# Patient Record
Sex: FEMALE | Race: BLACK OR AFRICAN AMERICAN | Employment: FULL TIME | URBAN - METROPOLITAN AREA
[De-identification: names, ages, dates, MRNs, and addresses within clinical notes are randomized per-mention and may not be internally consistent; named-entity substitution may affect disease eponyms.]

---

## 2018-11-22 PROBLEM — I16.1 HYPERTENSIVE EMERGENCY WITHOUT CONGESTIVE HEART FAILURE: Status: ACTIVE | Noted: 2018-11-22

## 2018-11-22 PROBLEM — R73.9 HYPERGLYCEMIA: Status: ACTIVE | Noted: 2018-11-22

## 2018-11-22 PROBLEM — R79.89 ELEVATED TROPONIN: Status: ACTIVE | Noted: 2018-11-22

## 2018-11-22 NOTE — ED NOTES
Pt states she did not know she has high blood pressure. Does not have a doctor. Pt given hand out for EMG doctors. Encouraged pt to get established with a PCP immediately to evaluate her blood pressure.   Pt denies any headache, blurred vision, dizziness

## 2018-11-22 NOTE — ED PROVIDER NOTES
Patient Seen in: [facility]    History   Patient presents with:  Laceration Abrasion (integumentary)    Stated Complaint: LACERATION RIGHT INDEX FINGER    HPI    CHIEF COMPLAINT: Right index finger laceration    HISTORY OF PRESE Current:BP (!) 221/97   Pulse 72   Temp 97.9 °F (36.6 °C) (Oral)   Resp 16   Ht 181.6 cm (5' 11.5\")   Wt 79.4 kg   SpO2 98%   BMI 24.07 kg/m²         Physical Exam   Constitutional: She is oriented to person, place, and time.  She appears well-developed an Rate, intervals and axes as noted on EKG Report. Rate: 80  Rhythm: Sinus Rhythm  Reading: Normal sinus rhythm with inverted T waves noted in the lateral leads without ST elevation, ectopy. No  EKG for comparison. 1420:  After lengthy discussion with otis

## 2018-11-22 NOTE — ED INITIAL ASSESSMENT (HPI)
Pt states sustained laceration to pad of right index finger 90 minutes ago. Arrived with a hair band wrapped around her fingertip.   Finger mobile  Pt hypertensive on arrival.

## 2018-11-23 PROBLEM — R79.89 TROPONIN I ABOVE REFERENCE RANGE: Status: ACTIVE | Noted: 2018-11-22

## 2018-11-23 NOTE — DIETARY NOTE
Dietitian Short Note - Education    Received consult for new dx DM. Per pt MD did not give her an official dx yet but HgbA1C was elevated at 7.7. Pt with poor eating habits which include skipping meals, snacking on junk food, and drinking sugary beverages.

## 2018-11-23 NOTE — PLAN OF CARE
Reviewed stress echo and echo findings with Dr. Eduardo Magallanes. Patient complained of some L sided chest pain this afternoon that resolved with belching. ECG reviewed and without significant change yet abnormal, repeat troponin sent.      Plan to stay in hospital

## 2018-11-23 NOTE — PLAN OF CARE
Pt. Is alert and oriented times four. Lungs clear on auscultation. Blood pressure now running in the 865'I systolic. Pt. Has no c/o pain. Pt. Is sinus rhythm on monitor. Plan for stress echo today. M. D. Aware of elevated troponins and EKG changes.

## 2018-11-23 NOTE — ED PROVIDER NOTES
Patient Seen in: [facility] Emergency Department    History   Patient presents with:  Hypertension (cardiovascular)    Stated Complaint: hypertension, abnormal EKG    HPI    24-year-old female presents the emergency department [facility]. No accessory muscle use noted for breathing. Cardiac: Regular rate and rhythm. Normal S1 and 2 without murmurs or ectopy appreciated  Abdomen: Soft on examination without tenderness to deep palpation or to percussion.   No masses appreciated Avril Humphrey MD on 11/22/2018 at 16:34     Approved by: Avril Humphrey MD                [facility]   Patient was initially given oral clonidine for blood pressure. Laboratories are sent glucose 129 creatinine 1.06. CBC was normal.  Troponin was 0.167.

## 2018-11-23 NOTE — H&P
[facility]                                                               History & Physical         [facility] Patient Status:  Inpatient    1960 MRN [id number]   [facility] 8NE-A Attending Jacy Weir MD   [facility] Day # drugs. Allergies:  No Known Allergies    Home Medications:    No medications prior to admission. Review of Systems:  A comprehensive 14 point review of systems was completed. Pertinent positives and negatives noted in the the HPI.     Physical Exam: to [place] urgent care. Patient states BP was high and EKG was abnormal.  Patient denies pain. FINDINGS:  Mild left basilar atelectasis. Cardiac silhouette is mildly prominent size. Costophrenic angles are clear.   No measurable pneumothorax

## 2018-11-23 NOTE — PROGRESS NOTES
[facility]  Progress Note    Darryl Lyn Patient Status:  Inpatient    1960 MRN [id number]   [facility] 8NE-A Attending Eyl Barrera, [address] [facility] Day # 1 PCP PHYSICIAN NONSTAFF     A/P:  HTN Emergency with elevated troponin 85.8 11/23/2018    MCH 28.3 11/23/2018    MCHC 33.0 11/23/2018    RDW 13.1 11/23/2018    .0 11/23/2018     No results found for: PT, INR    Medications:    Current Facility-Administered Medications:   AmLODIPine Besylate (NORVASC) tab 5 mg 5 mg Oral

## 2018-11-23 NOTE — PROGRESS NOTES
[facility]  Progress Note     Justen Lopez Patient Status:  Inpatient    1960 MRN [id number]   [facility] 8NE-A Attending Shirlene Villasenor, [address] [facility] Day # 1 PCP PHYSICIAN NONSTAFF     Chief Complaint: finger laceration, unco • Metoprolol Succinate ER  25 mg Oral 2x Daily(Beta Blocker)       ASSESSMENT / PLAN:       1. Malignant HTN with hypertensive emergency with elevated troponin, new onset hypertension  1. metoprolol and Norvasc. asa   2. Follow troponin trend.   osorio lower

## 2018-11-23 NOTE — CONSULTS
CARDIOLOGY CONSULT - [facility]      NAME: Ivon [facility] - ROOM: 8418/[id number] - MRN: [id number] - Age: 59year old - :   1960    Date of Admission: 2018  3:33 PM  Admission Diagnosis: Elevated troponin [R74.8] Oral   SpO2: 99% 97% 99% 98%   Weight:       Height:           Oxygen Therapy  SpO2: 98 %  O2 Device: None (Room air)  Pulse Oximetry Type: Continuous                       Wt Readings from Last 3 Encounters:  11/22/18 : 175 lb (79.4 kg)  11/22/18 : 175 l

## 2018-11-23 NOTE — PLAN OF CARE
Pt. States that she has a dull pressure under her left breast. 12 lead EKG done. Some changes noted in 2, 3 and AVF. She states the pain is gone now after belching. SHANTAL Peterson notified.

## 2018-11-23 NOTE — IMAGING NOTE
Preliminary stress echo  Resting EKG NSR, PRWP, LAD, NSSTTW  Patient completed 7 minutes on a chandler protocol achieving 91% APMHR and a peak /100. Patient denied cardiac symptoms. Stopped due to fatigue. ST changes noted with exercise.   Echo to

## 2018-11-24 NOTE — PROGRESS NOTES
[facility] Cardiology Progress Note    Subjective:  Feels good. Asking about some over the counter medications she takes. Relates long standing hx of GERD. Did not have cp on treadmill yesterday.      Objective:  BP (!) 183/95 (BP Location: Right arm)   Pulse

## 2018-11-24 NOTE — PAYOR COMM NOTE
--------------  CONTINUED STAY REVIEW    Payor: [facility] PP  Subscriber #:  [id number]  Authorization Number: [id number]    Admit date: 11/22/18  Admit time: 1957    Admitting Physician: Nicole Rodriguez MD  Attending Physician:  Cielo Vernon MD  Primary C of 1.07 mg/dL (H)). Lab  11/22/18   1549  11/22/18   2207  11/23/18   0526   TROP  0.167*  0.163*  0.166*  0.149*       ASSESSMENT / PLAN:         1.  Malignant HTN with hypertensive emergency with elevated troponin, new onset hypertension  1. metoprolol monday            MEDICATIONS ADMINISTERED IN LAST 1 DAY:  AmLODIPine Besylate (NORVASC) tab 5 mg     Date Action Dose Route User    11/24/2018 0828 Given 5 mg Oral Theo Lewis, RN      hydrALAzine HCl (APRESOLINE) injection 10 mg     Date Action Dose Ro

## 2018-11-24 NOTE — PROGRESS NOTES
[facility]  Progress Note     Larry Loyola Patient Status:  Inpatient    1960 MRN [id number]   [facility] 8NE-A Attending Teagan Ching, [address] [facility] Day # 2 PCP PHYSICIAN NONSTAFF     Chief Complaint: finger laceration, unco AmLODIPine Besylate  5 mg Oral Daily   • Metoprolol Succinate ER  50 mg Oral 2x Daily(Beta Blocker)   • aspirin  325 mg Oral Daily       ASSESSMENT / PLAN:       1.  Malignant HTN with hypertensive emergency with elevated troponin, new onset hypertension  1

## 2018-11-24 NOTE — PROGRESS NOTES
11/24/18 2914   Clinical Encounter Type   Visited With Patient  (Visiting  at bedside)   Routine Visit Introduction  (Visiting  requested Clarice ybarra and wafer to offer the pt.)   Continue Visiting No   Patient's [employer]

## 2018-11-25 NOTE — PROGRESS NOTES
CARDIOLOGY PROGRESS NOTE - [facility]      NAME: Onofre Lowery - ROOM: [id number] - MRN: [id number] - Age: 59year old - :  1960    Date of Admission: 2018  3:33 PM  Admission Diagnosis: Elevated troponin [R74. --    CA  8.7  8.6   --    --    MG   --   2.3   --    --        Recent Labs      11/22/18   1549   ALT  33   AST  25   ALB  3.8       ASSESSMENT:   60 yo F    Hypertensive urgency, BP now better  Trop 0.16  Abnormal stress echo, hyperdynamic response to

## 2018-11-25 NOTE — PROGRESS NOTES
[facility]  Progress Note     [facility] Patient Status:  Inpatient    1960 MRN [id number]   [facility] 8NE-A Attending Isaac Mc, [address] [facility] Day # 3 PCP PHYSICIAN NONSTAFF     Chief Complaint: finger laceration, unco 0.163*  0.166*  0.149*  0.163*            Imaging: Imaging data reviewed in Epic.     Medications:   • atorvastatin  20 mg Oral Nightly   • AmLODIPine Besylate  10 mg Oral Daily   • lisinopril  5 mg Oral Daily   • Metoprolol Succinate ER  50 mg Oral 2x Gail

## 2018-11-26 NOTE — PLAN OF CARE
Patient is alert and oriented times four. Lungs clear on auscultation. Pt. Has no c/o pain. IV of 0.9 NS at 20 cc/hr. Pt. Is sinus rhythm on monitor. No c/o pain at present. She is NPO for angiogram. Procedure explained to patient. Consent has been signed.

## 2018-11-26 NOTE — PROCEDURES
[facility]  PCI Procedure Note    Tennie Screen Location: Cath Lab    CSN [id number] MRN [id number]   Admission Date 11/22/2018 Procedure Date 11/26/2018   Attending Physician Alfredo Tomas MD Procedure Physician Sky Martinez MD     Pre-Procedu rate was monitored throughout the case from 1230 until 13 10 PM      Impression:  1. Prior to PTCA and stent placement there is MIKE-3 flow in the LAD with a 75% stenosis in the mid LAD and a 90% stenosis in the proximal LAD.   Following PTCA and stent vivian

## 2018-11-26 NOTE — DIETARY NOTE
Nutrition Short Note    Dietitian consult received per cardiac rehab/CHF protocol. Pt to be educated by cardiac rehab staff and encouraged to attend outpatient classes taught by RAMSES. RD available PRN.     Clover Agudelo RD, LDN  Pager #[phone]

## 2018-11-26 NOTE — PAYOR COMM NOTE
--------------  CONTINUED STAY REVIEW    Payor: [facility] PP  Subscriber #:  [id number]  Authorization Number: [id number]    Admit date: 11/22/18  Admit time: 1957    Admitting Physician: Andrea Camilo MD  Attending Physician:  Veronica Mckeon MD  Primary C status: full  · Humphrey: no             MEDICATIONS ADMINISTERED IN LAST 1 DAY:  AmLODIPine Besylate (NORVASC) tab 10 mg     Date Action Dose Route User    11/25/2018 0921 Given 10 mg Oral Demi Sanchez, RN      hydrALAzine HCl (APRESOLINE) injection 10 m

## 2018-11-26 NOTE — PROGRESS NOTES
11/26/18 1236   Clinical Encounter Type   Visited With Patient not available   Spiritism Encounters   Spiritual Requests During Visit / Hospitalization Communion  ( attempted visit twice. Patient not in the room.   to remain available at

## 2018-11-26 NOTE — PROCEDURES
[facility]  Angiogram Procedure Note    Juanita Heller Location: Cath Lab    CSN [id number] MRN [id number]   Admission Date 11/22/2018 Procedure Date 11/26/2018   Attending Physician Neo Salguero MD Procedure Physician Breana Grove MD     Pre-P ejection fraction is 60%    Selective renal angiography shows no evidence of renal artery stenosis    Selective Coronary Angiography Findings: 1. Angiography of the right coronary artery is remarkable for this being a right dominant system.   The right cor

## 2018-11-26 NOTE — PROGRESS NOTES
[facility]  Progress Note     [facility] Patient Status:  Inpatient    1960 MRN [id number]   [facility] 8NE-A Attending Isaac Mc, [address] [facility] Day # 4 PCP PHYSICIAN NONSTAFF     Chief Complaint: finger laceration, unco 0.163*  0.166*  0.149*  0.163*            Imaging: Imaging data reviewed in Epic.     Medications:   • [START ON 11/27/2018] aspirin  325 mg Oral Daily   • atorvastatin  20 mg Oral Nightly   • AmLODIPine Besylate  10 mg Oral Daily   • lisinopril  5 mg Oral

## 2018-11-26 NOTE — CONSULTS
[facility]  Diabetes Clinical Nurse Specialist Consult Note    [facility] Patient Status:  Inpatient    1960 MRN [id number]   [facility] 8NE-A Attending Ocatvia Schulte MD   Hosp Day # 4 PCP PHYSICIAN NONSTAFF     Reason f to the [facility] for Initial Diabetes Self Management Training Classes, new onset diabetes    PRESCRIPTION RECOMMENDATIONS:    · Consider metformin   · [facility] manager  · Covers the Contour Next glucometer and test stri

## 2018-11-26 NOTE — PROGRESS NOTES
11/26/18 1945   Clinical Encounter Type   Visited With Patient   Continue Visiting No  ( remains available at pager #[phone] as needed/requested.)   Patient's Supportive Strategies/Resources  provided emotional and spiritual support to osmin

## 2018-11-27 NOTE — PROGRESS NOTES
[facility]  Cardiology Progress Note    Subjective:  No chest pain or shortness of breath. Right groin cath site c/d/i, no hematoma, no bruit.     Objective:  /65 (BP Location: Left arm)   Pulse 61   Temp 98.4 °F (36.9 °C) (Oral)   Resp 20   Ht Follow-up with us in the office as outlined above.   Vida Carbone

## 2018-11-27 NOTE — PLAN OF CARE
NURSING DISCHARGE NOTE    Discharged to home  via private car. Accompanied by family  Belongings taken with. .    Pt discharged to home. Discharge instructions and script reviewed with pt . All questions answered. IV catheter removed.  Pt tolerated wel

## 2018-11-27 NOTE — PROGRESS NOTES
[facility] [address]  [place], [address]  ?  11/27/18  ? Re: Ez Stanley  ? To Whom It May Concern:    Ez Stanley was admitted to [facility] from 11/22/2018 to 11/27/18.     Please excuse Ezpetr Stanley from atte

## 2018-11-27 NOTE — PLAN OF CARE
Pt alert able to make needs known. Denies any pain. Right groin soft to touch. No bleeding or hematoma noted. Pt scheduled to be discharged today. Plan of care discussed with pt, verbalized understanding.   Call light within reach, will cont to [facility]

## 2018-11-27 NOTE — CONSULTS
[facility]  Diabetes Clinical Nurse Specialist Progress Note    Bon Mcdonald Patient Status:  Inpatient    1960 MRN [id number]   [facility] 8NE-A Attending Radha Melvin MD   Hosp Day # 5 PCP PHYSICIAN NONSTAFF     Diagnos introduction to carbohydrate counting and meal planning  Discharge to [facility] for Initial Diabetes Self Management Training Classes, new onset diabetes  Discharge to new PCP    PROVIDER F/U RECOMMENDATIONS:    · Patient's current PCP  · [place]

## 2018-11-27 NOTE — CARDIAC REHAB
Cardiac rehab education completed with patient. Stent card given to patient. Patient to contact [facility] for her outpatient appointment.

## 2018-11-28 NOTE — TELEPHONE ENCOUNTER
np called to advise she was not supposed to start MetFORMIN until tomorrow but started today. Pt is concerned because she started meds too soon. Please call to advise.

## 2018-11-28 NOTE — DISCHARGE SUMMARY
[facility]  DISCHARGE SUMMARY     Onofre Lowery Patient Status:  Inpatient    1960 MRN [id number]   [facility] 8NE-A Attending No att. providers found   Hosp Day # 5 PCP PHYSICIAN NONSTAFF     Date of Admission: 2018 have uncontrolled dm2. Was started on several antihypertensives, see below, as well as statin, asa, metformin, the latter for dm2. Underwent angiogram and had LAD occlusion so was deployed Deisy x3 to LAD. See accurate med list below.     Lace+ Score: 46 LANCETS 33G Misc      Use one lancet for each blood glucose test. Test 3 x week in the AM before breakfast, and 2 x a week 2 hours after large meal.   Quantity:  100 each  Refills:  3     Prasugrel HCl 10 MG Tabs  Commonly known as:  EFFIENT      Take 1 ta auscultation bilaterally. No wheezes. No rhonchi. Cardiovascular: S1, S2. Regular rate and rhythm. No murmurs, rubs or gallops. Abdomen: Soft, nontender, nondistended. Positive bowel sounds. No rebound or guarding.   Neurologic: No focal neurological de

## 2018-11-28 NOTE — PAYOR COMM NOTE
--------------  DISCHARGE REVIEW    Payor: [facility]  Subscriber #:  [id number]  Authorization Number: [id number]    Admit date: 11/22/18  Admit time:  1957  Discharge Date: 11/27/2018  3:07 PM     Admitting Physician: Andrea Camilo MD  Primary Care Phys

## 2018-12-05 NOTE — PROGRESS NOTES
Jarred Huff  : 1960 attended Step 1 Diabetic Education:    Date: 2018   Start time: 1:30pm End time: 2:30pm    /81   Pulse 62   Ht 60\"   Wt 175 lb   BMI 34.18 kg/m²     HgbA1C (%)   Date Value   2018 7.7 (H)        Depressio

## 2018-12-07 NOTE — PROGRESS NOTES
[address] [facility] Internal Medicine Office Note  Chief Complaint:   Patient presents with:  [facility] F/U: 11/22/18~ Suture removal right index finger dt laceration. Also, recently diagnosed with Type II DM.        HPI:   This is a 59 year o Besylate 10 MG Oral Tab Take 1 tablet (10 mg total) by mouth daily. Disp: 30 tablet Rfl: 2   atorvastatin 20 MG Oral Tab Take 1 tablet (20 mg total) by mouth nightly.  Disp: 30 tablet Rfl: 2   Glucose Blood (CONTOUR NEXT TEST) In Vitro Strip Use one test st Vital signs reviewed. Appears stated age, well groomed. Physical Exam   Vitals reviewed. Constitutional: She appears well-developed. No distress. HENT:   Head: Normocephalic and atraumatic. Right Ear: Tympanic membrane is not erythematous.    Left this encounter       Imaging & Consults:  None    Diabetes Care Dilated Eye Exam due on 12/08/1960  Mammogram due on 12/08/1960  Colonoscopy due on 12/08/1960  Annual Physical due on 12/08/1960  Pneumococcal PPSV23 Medium Risk Adult(1 of 1 - PPSV23) due on

## 2018-12-07 NOTE — TELEPHONE ENCOUNTER
Faxed [facility] paperwork to #[phone]. [facility] form unsigned by patient. Call patient Monday (12/10/18) on how to proceed.

## 2018-12-07 NOTE — TELEPHONE ENCOUNTER
Received FMLA forms from [address]. Pt requesting for them to be faxed to the contact # on the form. Needs to be completed prior to next Friday. Forms are in your inbasket. Thank you!

## 2018-12-10 NOTE — TELEPHONE ENCOUNTER
Patient has appointment 12/12/18 with Brenden Garrido RN here in our office. Will  original FMLA form at that time. Patient states will call Jasmyn Mauro ([employer] DEPT) to see if her signature is needed on the FMLA form.   Will refax [employer] [facility]

## 2018-12-12 NOTE — PROGRESS NOTES
Cristino Bernheim  [id number] attended Step 2 Diabetic Education:    Date: 12/12/2018    Start time: 8am End time: 9:30am      Diabetes Overview, pathophysiology, pre-diabetes, A1C results and treatment options for diabetes self-management, types of diab

## 2018-12-13 NOTE — TELEPHONE ENCOUNTER
3 way call from [facility] for patient to specify what specifications she needs for refill of Contour test strips :  Please send 100 strips for 30 day supply.  Any questions/call patient

## 2018-12-13 NOTE — TELEPHONE ENCOUNTER
Spoke with pt and notified her refill sent in on 12/4/18 by [facility] for 100 test strips with 2 refills. Pt stated that the pharmacy only gave her 25 test strips.      Called pharmacy and they stated pt's insurance would only accept 25 test strips as they had

## 2018-12-21 NOTE — PROGRESS NOTES
[facility] Internal Medicine Office Note  Chief Complaint:   Patient presents with:  CPX: with breast exam.       HPI:   This is a 59year old female coming in for physical  HPI  She notes she bruises easily on extremities.  Not painful     She ea glucose test. Test 3 x a week in the AM, and 2 x week after a meal. Disp: 50 strip Rfl: 3   ONETOUCH DELICA LANCETS 31R Does not apply Misc Use one lancet for each blood glucose test. Test 3 x week in the AM before breakfast, and 2 x a week 2 hours after l normal. Right breast exhibits no mass and no nipple discharge. Left breast exhibits no mass and no nipple discharge. Breasts are symmetrical.   Abdominal: Soft. There is no tenderness. Lymphadenopathy:     She has no cervical adenopathy.    Neurological: daily.        Orders Placed This Encounter      TSH W Reflex To Free T4 [E]      Meds & Refills for this Visit:  Requested Prescriptions     Signed Prescriptions Disp Refills   • MetFORMIN HCl 500 MG Oral Tab 180 tablet 3     Sig: Take 1 tablet (500 mg tot

## 2018-12-21 NOTE — PATIENT INSTRUCTIONS
Prevnar pneumonia vaccine recommended  shingrix recommended  Flu vaccine strongly recommended     Blood work - TSH for thyroid - can test when you have your other blood work drawn for cardiology     Feb 24 or later for follow up appointment with me and we

## 2019-01-09 NOTE — PROGRESS NOTES
Jose Bird  [id number] attended Step 3 Diabetic Education:    Date: 1/9/2019  Start time: 8am End time: 9am          Prevention, detection and treatment of chronic complications  Reviewed how to reduce risks of complications including eye, foot, d

## 2019-01-15 NOTE — TELEPHONE ENCOUNTER
Spoke with pt and notified her of results and provider instructions. Pt stated that originally she was prescribed 500 mg BID of Metformin at the end of November.  Pt stated that as of last Wednesday she met with Elaine Garzon and was told to decrease the Metformin

## 2019-01-17 NOTE — TELEPHONE ENCOUNTER
If she is checking glu at different times of day (ok to check once daily) and avg is still around 116, then it is ok to continue metformin once daily

## 2019-01-17 NOTE — TELEPHONE ENCOUNTER
Pt called back. Pt stated that she is checking blood sugar BID and it is averaging around 116. Notified pt of provider response. Pt stated that she was taking 1 BID again and wanted to know if provider wanted her to continue that.  Notified pt again of prov

## 2019-02-28 NOTE — PROGRESS NOTES
[facility] Internal Medicine Office Note  Chief Complaint:   Patient presents with:   Follow - Up      HPI:   This is a 59year old female coming in for DM follow up  HPI    Her fasting glu have been doing well   In 70s  After having a lot of frui blood glucose test. Test 3 x a week in the AM, and 2 x week after a meal. Disp: 50 strip Rfl: 3   ONETOUCH DELICA LANCETS 43F Does not apply Misc Use one lancet for each blood glucose test. Test 3 x week in the AM before breakfast, and 2 x a week 2 hours a visit:    New onset type 2 diabetes mellitus ([facility] [address] - due for a1c after April 10. Cont metformin.  F/u in 3 months  -     HEMOGLOBIN A1C; Future    Essential hypertension -BP well controlled; cont present management       Orders Placed This Encounter      He

## 2019-03-27 NOTE — PROGRESS NOTES
Fredrick Ibrahim  :1960 attended Step 4 Diabetic Education:    Date: 3/27/2019 Referring Provider: Josue Larose  Start time: 10:30 End time: 11:30am      Ht 62\"   Wt 170 lb   BMI 31.09 kg/m²   Weight Change:  Pt has lost 13 lbs since her first visit wi [] Shea Figueroa Iman [phone] www. [employer]      [] [employer] www. [employer]      [] [employer] [phone]    Journals      [] Diabetes Forecast [phone] www. diabetesforecast.com      [] Diabetes Self-Management [phone]

## 2019-04-06 NOTE — TELEPHONE ENCOUNTER
Please call patient that she is due for diabetic eye exam. See [facility] [phone]

## 2019-04-09 NOTE — TELEPHONE ENCOUNTER
BRYANM for patient that she is due for her diabetic eye exam and that she can schedule that appointment at the [facility].

## 2019-05-06 NOTE — TELEPHONE ENCOUNTER
Notes recorded by Silvia Bernal MD on 5/2/2019 at 2:02 PM CDT  a1c is improved to 6.8; repeat in 3 months      Pt notified of results and provider instructions. Pt verbalized understanding. Repeat lab ordered.

## 2019-06-12 NOTE — TELEPHONE ENCOUNTER
Refill requested:   Requested Prescriptions     Pending Prescriptions Disp Refills   • AMLODIPINE BESYLATE 10 MG Oral Tab [Pharmacy Med Name: AMLODIPINE 10MG TAB] 90 tablet 1     Sig: TAKE 1 TABLET BY MOUTH ONCE DAILY     Passed protocol    Last refill: 12 registered dietitian and  diabetes educator. Half of this appointment time is dedicated to the  development of a personalized education plan. This plan is developed  privately in a 1:1 session with the educator.  The other half hour is  devoted to learning

## 2019-06-26 NOTE — PROGRESS NOTES
Justen Lopez  : 1960 was seen for Diabetic Education Follow up:    Date: 2019  Referring Provider: Ileana Hernández  Start time: 8am End time: 9am    Assessment:     Assessment: Ht 62\"   Wt 174 lb   BMI 31.83 kg/m²      HgbA1C (%)   Date Sidra complications reviewed including eye, dental, CV health, renal protection, and foot care discussed. Health Coping  Family involvement/support encouraged  Depression and diabetes reviewed. Recommendations:      1. Follow recommended meal plan.    2. Te

## 2019-06-27 NOTE — PROGRESS NOTES
[facility] Internal Medicine Office Note  Chief Complaint:   Patient presents with:   Follow - Up: DM follow up   Cough: dry cough       HPI:   This is a 59year old female coming in for follow up  HPI  DM -   She has diabetic retinopathy and sees tablet (20 mg total) by mouth nightly.  Disp: 30 tablet Rfl: 2   Glucose Blood (CONTOUR NEXT TEST) In Vitro Strip Use one test strip for each blood glucose test. Test 3 x a week in the AM, and 2 x week after a meal. Disp: 50 strip Rfl: 3   ONETOUCH DELICA L barefoot skin diabetic exam is normal, visualized feet and the appearance is normal.  Bilateral monofilament/sensation of both feet is normal.  Pulsation pedal pulse exam of both lower legs/feet is normal as well.   ASSESSMENT AND PLAN:   Jarred Huff Education: Patient/Caregiver Education: There are no barriers to learning. Medical education done. Outcome: Patient verbalizes understanding. Patient is notified to call with any questions, complications, allergies, or worsening or changing symptoms.   Barbara

## 2019-06-27 NOTE — PATIENT INSTRUCTIONS
Blood work after August 3     Try claritin, zyrtec, or allegra (one at a time)  xyzal if above not helpful  Call me if symptoms continue and we can add on singulair/montelukast        For heartburn:   Can take zantac over the counter as needed (ranitidine

## 2019-10-01 NOTE — PATIENT INSTRUCTIONS
vanicream or vaseline recommended     Flu shot recommended     Stool testing recommended - can go to the lab

## 2019-10-01 NOTE — PROGRESS NOTES
[facility] Internal Medicine Office Note  Chief Complaint:   Patient presents with:   Follow - Up: 3 month follow up       HPI:   This is a 59year old female coming in for DM   HPI  DM - she met with Fela Gomez and discussed that fasting should be bel Disp: 30 tablet Rfl: 2   ONETOUCH DELICA LANCETS 98E Does not apply Misc Use one lancet for each blood glucose test. Test 3 x week in the AM before breakfast, and 2 x a week 2 hours after large meal. Disp: 100 each Rfl: 3   aspirin 81 MG Oral Tab EC Take 1 edema, without long-term current use of insulin ([address]  Uncontrolled type 2 diabetes mellitus with hyperglycemia ([address]      The plan is as follows  Tisha Loza was seen today for follow - up.     Diagnoses and all orders for this visit:    Essential hypertension Patient/Caregiver Education: There are no barriers to learning. Medical education done. Outcome: Patient verbalizes understanding. Patient is notified to call with any questions, complications, allergies, or worsening or changing symptoms.   Patient is to c

## 2020-01-27 NOTE — TELEPHONE ENCOUNTER
Received Eye Exam from Dr Vianey Fonseca. Placed in your inbasket. Exam completed on 1/25/2020 - placed in your inbasket. Moderate NPDR on Left and Right Eye.

## 2020-02-11 NOTE — PROGRESS NOTES
[address] [facility] Internal Medicine Office Note  Chief Complaint:   Patient presents with:   Follow - Up: 4 month follow up/ fasting       HPI:   This is a 62year old female coming in for physical  HPI    DM - glu at home ranging from 120s-150s fasting Take 75 mg by mouth daily. • Metoprolol Succinate ER 25 MG Oral Tablet 24 Hr Take 25 mg by mouth daily. • atorvastatin 20 MG Oral Tab Take 1 tablet (20 mg total) by mouth nightly.  30 tablet 2   • ONETOUCH DELICA LANCETS 15T Does not apply Misc Use Pulmonary/Chest: Effort normal and breath sounds normal.   Abdominal: Soft. There is no tenderness. Lymphadenopathy:     She has no cervical adenopathy. Neurological: She is alert. Skin: Skin is warm and dry.    Psychiatric: She has a normal mood an MICROALB/CREAT RATIO, RANDOM URINE;  Future  -     PODIATRY - INTERNAL    Dyspnea on exertion -may be related to deconditioning as she has not been exercising but she needs to discuss this with her cardiologist in regards to need for further testing     Thi

## 2020-02-11 NOTE — PATIENT INSTRUCTIONS
Flu shot recommended    Pneumonia vaccine (prevnar) recommended    Shingrix shingles vaccine recommended        Blood work Feb 16 or later       Monitor blood pressure at home and if persists under 110, let your cardiologist know because amlodipine dose ma

## 2020-07-17 NOTE — TELEPHONE ENCOUNTER
Patient calling b/c she just realized she missed a June 2020 f/u appt with Dr Caity Thibodeaux. Informed her that reminder calls were disabled and it was just no show without a charge; also told her doctor wanted her to follow with dm f/u appt at that time.  She want

## 2020-08-18 NOTE — PROGRESS NOTES
[facility] Internal Medicine Office Note  Chief Complaint:   Patient presents with:   Follow - Up: DM check       HPI:   This is a 62year old female coming in for DM follow up  HPI  DM - on metformin; last a1c 7.4    HTN - BP on lower side at 110 ONETOUCH DELICA LANCETS 94R Does not apply Misc Use one lancet for each blood glucose test. Test 3 x week in the AM before breakfast, and 2 x a week 2 hours after large meal. 100 each 3   • aspirin 81 MG Oral Tab EC Take 1 tablet (81 mg total) by mouth josé luis today for follow - up.     Diagnoses and all orders for this visit:    Type 2 diabetes mellitus without complication, without long-term current use of insulin (HCC) -cont metformin; a1c at goal  -     HEMOGLOBIN A1C =6.9    Essential hypertension - cont lis

## 2020-08-18 NOTE — TELEPHONE ENCOUNTER
Persons with disabilities certification for parking placard/license plates form filled out at time of appt     Original given to pt  Copy sent to scan and copy placed in FMLA/Dias[employer] accordion

## 2020-08-19 NOTE — TELEPHONE ENCOUNTER
Please advise what labs if any pt should complete prior to upcoming appt. Annual labs completed in 2/2020. A1c completed 8/18/2020.  TY

## 2020-08-19 NOTE — TELEPHONE ENCOUNTER
Patient has an appt scheduled on 09/08/2020 and is requesting her labs to be placed prior to her appt so she can schedule with the lab. Please call pt with order status.

## 2020-09-02 NOTE — TELEPHONE ENCOUNTER
Pt had questions regarding the mildly abnormal microalbumin. Pt stated she is going to discuss this w/ Dr. Arin Avila at upcoming appointment.

## 2020-09-08 PROBLEM — E11.29 MICROALBUMINURIA DUE TO TYPE 2 DIABETES MELLITUS (HCC): Status: ACTIVE | Noted: 2020-09-08

## 2020-09-08 PROBLEM — R80.9 MICROALBUMINURIA DUE TO TYPE 2 DIABETES MELLITUS (HCC): Status: ACTIVE | Noted: 2020-09-08

## 2020-09-08 NOTE — PATIENT INSTRUCTIONS
Call central scheduling to make appointment for mammogram and upcoming blood work [phone]    Blood work due 11/19/2020 or later

## 2020-09-08 NOTE — PROGRESS NOTES
[facility] Internal Medicine Office Note  Chief Complaint:   Patient presents with:   Follow - Up      HPI:   This is a 62year old female coming in for follow up  HPI    HTN - amlodipine dose was decreased at last visit due to low BP and hand swe each blood glucose test. Test 3 x week in the AM before breakfast, and 2 x a week 2 hours after large meal. 100 each 3   • aspirin 81 MG Oral Tab EC Take 1 tablet (81 mg total) by mouth daily.  30 tablet 11   • lisinopril 20 MG Oral Tab Take 1 tablet (20 mg - up.    Diagnoses and all orders for this visit:    Type 2 diabetes mellitus without complication, without long-term current use of insulin ([facility] -cont metformin  -she follows routinely with retina specialist   -     HEMOGLOBIN A1C; Future  -     Deny Reynoso

## 2020-10-13 NOTE — TELEPHONE ENCOUNTER
Patient asking for nurse callback to discuss medications; she would like to verify list and have faxed to her attention: Fax [phone]  She needs for dentis appt tomorrow

## 2020-10-19 NOTE — TELEPHONE ENCOUNTER
Patient calling she was exposed to [place] [address] by coworker on Friday for a couple hours; she does not have symptoms now but would like to talk to nurse she has questions to discuss further

## 2020-10-19 NOTE — TELEPHONE ENCOUNTER
I placed an order in the system if she would like to test. If the co-worker was wearing a mask and she was wearing a mask, then she is at low risk for transmission. If masks were not worn, then she is at higher risk for esteban COVID-19.   If she was in

## 2020-10-19 NOTE — TELEPHONE ENCOUNTER
Pt exposed to co-worker for a couple hours on Friday 10/16 who tested on Saturday and received positive results Ethan 10/18. Pt has with no symptoms. Should she test?  Self quarantine?

## 2020-10-23 NOTE — TELEPHONE ENCOUNTER
Pt aware lab \"in process\"  Offered [employer] activation code, pt refused. Will call back on Monday if results are not back by the end of the day, today.

## 2020-10-29 NOTE — TELEPHONE ENCOUNTER
Patient is having dentist appointment today and needs Negative Covid 19 testing results faxed to her so that she can take today by 4pm    Please fax to [phone]

## 2020-12-15 NOTE — PROGRESS NOTES
This is a telemedicine visit with live, interactive video and audio. Patient understands and accepts financial responsibility for any deductible, co-insurance and/or co-pays associated with this service.     SUBJECTIVE  DM - recent A1c 7.3  She has had continue metformin 500mg TID. She states she does not need a refill for metformin as she has a lot from previous. Let me know if further hypoglycemic events occur.   -     HEMOGLOBIN A1C; Future  -     LIPID PANEL;  Future  -     COMP METABOLIC PANEL (14);

## 2021-02-09 NOTE — TELEPHONE ENCOUNTER
LVMTCB   Pt due for cpx     Protocol passed   Medication(s) to Refill:   Requested Prescriptions     Pending Prescriptions Disp Refills   • AMLODIPINE BESYLATE 5 MG Oral Tab [Pharmacy Med Name: amLODIPine Besylate 5 MG Oral Tablet] 90 tablet 0     Sig: Martha Leyva

## 2021-02-09 NOTE — TELEPHONE ENCOUNTER
Appt scheduled - pt to have labs drawn first week of March. Last Televisit 12/20. Pt will be out of medication 2/13.     Future Appointments   Date Time Provider Albert De   3/16/2021  7:30 AM Delilah Aguirre MD [facility] [place]

## 2021-03-16 NOTE — TELEPHONE ENCOUNTER
Ninfa Jones MD - [facility] to obtain pt's diabetic eye exam report    Awaiting records

## 2021-03-16 NOTE — PROGRESS NOTES
[facility] Internal Medicine Office Note  Chief Complaint:   Patient presents with:  Physical      HPI:   This is a 56year old female coming in for physical   HPI   DM - recent a1c 7.2  Eye exam is up to date  microalbumin higher     HM: due for metFORMIN HCl 500 MG Oral Tab Take 1 tablet (500 mg total) by mouth 3 (three) times daily. 270 tablet 1   • Clopidogrel Bisulfate 75 MG Oral Tab Take 75 mg by mouth daily. • Metoprolol Succinate ER 25 MG Oral Tablet 24 Hr Take 25 mg by mouth daily. and Rhythm: Normal rate and regular rhythm. Heart sounds: Normal heart sounds. Pulmonary:      Effort: Pulmonary effort is normal.      Breath sounds: Normal breath sounds. Musculoskeletal:      Cervical back: Neck supple.       Comments: 1cm super [E]      Meds & Refills for this Visit:  Requested Prescriptions     Signed Prescriptions Disp Refills   • Glucose Blood (CONTOUR NEXT TEST) In Vitro Strip 100 each 11     Sig: Check glucose three times daily.        Imaging & Consults:  [facility] 2D+3D SCRE

## 2021-08-12 NOTE — PROGRESS NOTES
[facility] Internal Medicine Office Note  Chief Complaint:   Patient presents with:  Diabetes      HPI:   This is a 56year old female coming in for diabetes follow up  HPI      DM - stable at 7.1  She has retinopathy and has had 2 laser procedur ONETOUCH DELICA LANCETS 34Y Does not apply Misc Use one lancet for each blood glucose test. Test 3 x week in the AM before breakfast, and 2 x a week 2 hours after large meal. 100 each 3   • aspirin 81 MG Oral Tab EC Take 1 tablet (81 mg total) by mouth josé luis normal.          ASSESSMENT AND PLAN:   Ivon Whitman is a 56year old female with  Microalbuminuria due to type 2 diabetes mellitus ([facility]  (primary encounter diagnosis)  Type 2 diabetes mellitus without complication, without long-term current use of i

## 2022-01-03 PROBLEM — E78.5 HYPERLIPIDEMIA ASSOCIATED WITH TYPE 2 DIABETES MELLITUS (HCC): Status: ACTIVE | Noted: 2022-01-03

## 2022-01-03 PROBLEM — I15.2 HYPERTENSION ASSOCIATED WITH DIABETES (HCC): Status: ACTIVE | Noted: 2022-01-03

## 2022-01-03 PROBLEM — E11.59 HYPERTENSION ASSOCIATED WITH DIABETES (HCC): Status: ACTIVE | Noted: 2022-01-03

## 2022-01-03 PROBLEM — E11.69 HYPERLIPIDEMIA ASSOCIATED WITH TYPE 2 DIABETES MELLITUS (HCC): Status: ACTIVE | Noted: 2022-01-03

## 2022-01-03 NOTE — PROGRESS NOTES
Virtual Telephone Check-In    This visit is conducted using Telemedicine with live, interactive video and audio. Patient understands and accepts financial responsibility for any deductible, co-insurance and/or co-pays associated with this service.     Tel exposure. 1. She was in contact with a patient with COVID on 12/17th. She tested at outside facility on 12/24. She was asymptomatic. She had another exposure on 12/30. She has been isolating since 12/31.  She has been experiencing a dry cough but has that easily in complete sentences  NEURO: facial gestures grossly intact  PSYCH: pleasant    ASSESSMENT AND PLAN:   # Exposure to COVID on 12/30 - she is asymptomatic. COVID testing ordered.    # HTN: tolerating lisinopril  # Uncontrolled DM - A1C slightly above

## 2022-01-03 NOTE — TELEPHONE ENCOUNTER
Future Appointments   Date Time Provider Albert Kenisha   1/3/2022 11:30 AM Jag Andersen MD [facility] [place]

## 2022-01-03 NOTE — TELEPHONE ENCOUNTER
Patient requesting orders for covid test d/t exposure at work. Reports mild intermittent cough that is normal for her. Denies any other sxs.

## 2022-01-05 NOTE — TELEPHONE ENCOUNTER
Pt will need a copy of your results, doesn't have mychart. Explained if negative we can print and she can . If positive, we would have to wait. Pt will call on Friday to f/u.

## 2022-01-07 NOTE — TELEPHONE ENCOUNTER
Covid test negative--patient made aware. Result faxed to employer at [phone] per pt request. Fax confirmation received.

## 2022-01-07 NOTE — TELEPHONE ENCOUNTER
Pt wanted to know the status of her covid test results. I advised a nurse will return her call with results.      Confirmed [phone] as best contact number

## 2022-01-26 NOTE — TELEPHONE ENCOUNTER
[facility]  Department of Cardiology  Consult Note      PATIENT NAME: Tyra Isaac    MRN: [id number]  TODAY'S DATE: 01/26/2022  ADMIT DATE: 1/26/2022                          CONSULT REQUESTED BY: Annabella Wetzel MD    SUBJECTIVE     PRINCIPAL PROBLEM: Atrial fibrillation with rapid ventricular response  79-year-old female end-stage renal disease on hemodialysis Monday Wednesday Friday ([facility]) , history of anemia associated with CKD, hyperlipidemia, hypertension, AFib on Coumadin presents to the ER for evaluation of AFib with RVR while at hemodialysis today.  She reports that she was just completing her dialysis around 915 when she developed chest pain that is 8/10 midsternal pressure and tightness with associated shortness of breath though she reports both chest pain and shortness of breath or improved now.  She had been placed on 2 L oxygen with EMS oxygen was off on arrival she was 90% and comfortable.  She also reports nausea which is now resolved.  No diaphoresis lightheadedness or dizziness currently.  She did feel lightheaded earlier.  She reports that she is having palpitations with elevated heart rate and feels that she is skipping beats.  But is less severe than at 9:15 a.m. was symptoms began.  She does not know her home medications she has asked us to call her son Raffi     REASON FOR CONSULT:  Chest pain      HPI:  Patient is a 18-year-old lady with known history of end-stage renal disease on hemodialysis goes for HD on Monday Wednesday and Friday.  And she also has underlying atrial fibrillation for which she is on Coumadin history of hypertension hyperlipidemia.  Patient went for her dialysis and during the dialysis patient developed chest discomfort was also short of breath and she was also hypotensive at the same time.  And as a blood pressure improved her chest pain also got better.  She has no further chest discomfort at the present time.  Her breathing is good denies any  Received Dilated Retinal eye exam.  Pt has moderate NPDR (non-proliferative retinopathy). Placed in 's in basket for her to sign off on. shortness of breath or difficulty in breathing she does have intermittent palpitations with elevated heart rate.  And she has to be beats intermittently.        Review of patient's allergies indicates:   Allergen Reactions    Cyclobenzaprine     Fish containing products Hives    Peanut     Tramadol Itching       Past Medical History:   Diagnosis Date    A-fib     Anxiety     Depression     Disorder of kidney and ureter     Encephalopathy acute 1/1/2018    End stage kidney disease 6/17/2017    Gout     Hyperlipidemia     Hypertension     Moderate episode of recurrent major depressive disorder 1/17/2018    Nephropathy hypertensive, stage 5 chronic kidney disease or end stage renal disease 6/17/2017    Obstructive pattern present on pulmonary function testing 7/28/2021    Shows moderate obstruction.    Osteopenia of multiple sites 3/9/2018    Based upon bone density measurements. Patient also has chronic kidney disease.    Stroke 11/2016     Past Surgical History:   Procedure Laterality Date    CARDIAC SURGERY      stents    WRIST SURGERY       Social History     Tobacco Use    Smoking status: Never Smoker    Smokeless tobacco: Never Used   Substance Use Topics    Alcohol use: No    Drug use: No        REVIEW OF SYSTEMS  CONSTITUTIONAL: Negative for chills, fatigue and fever.   EYES: No double vision, No blurred vision  NEURO: No headaches, No dizziness  RESPIRATORY: Negative for cough, shortness of breath and wheezing.   CARDIOVASCULAR:  Episode of chest pain during chest pain. Negative for palpitations and leg swelling.   GI: Negative for abdominal pain, No melena, diarrhea, nausea and vomiting.   : Negative for dysuria and frequency, Negative for hematuria  SKIN: Negative for bruising, Negative for edema or discoloration noted.   ENDOCRINE: Negative for polyphagia, Negative for heat intolerance, Negative for cold intolerance  PSYCHIATRIC: Negative for depression, Negative for anxiety,  Negative for memory loss  MUSCULOSKELETAL: Negative for neck pain, Negative for muscle weakness, Negative for back pain     OBJECTIVE     VITAL SIGNS (Most Recent)  Temp: 97.8 °F (36.6 °C) (01/26/22 1604)  Pulse: 73 (01/26/22 1604)  Resp: 20 (01/26/22 1604)  BP: (!) 151/67 (01/26/22 1604)  SpO2: 97 % (01/26/22 1604)    VENTILATION STATUS  Resp: 20 (01/26/22 1604)  SpO2: 97 % (01/26/22 1604)       I & O (Last 24H):    Intake/Output Summary (Last 24 hours) at 1/26/2022 1736  Last data filed at 1/26/2022 1604  Gross per 24 hour   Intake 120 ml   Output --   Net 120 ml       WEIGHTS  Wt Readings from Last 3 Encounters:   01/26/22 1604 55.6 kg (122 lb 9.2 oz)   01/26/22 1052 55.8 kg (123 lb)   10/11/21 1107 55.8 kg (123 lb)   06/25/21 2342 57 kg (125 lb 10.6 oz)   06/25/21 1857 57.2 kg (126 lb)   06/25/21 1827 57.2 kg (126 lb)       PHYSICAL EXAM  GENERAL: well built, well nourished, well-developed in no apparent distress alert and oriented.   CONSTITUTIONAL: No fever, no chills  HEENT: Normocephalic, atraumatic,pupils reactive to light                 NECK:  No JVD no carotid bruit  CVS: S1S2+, RRR, systolic murmurs,   LUNGS: Clear  ABDOMEN: Soft, NT, BS+  EXTREMITIES: No cyanosis, edema  : No diaz catheter  NEURO: AAO X 3  PSY: Normal affect      HOME MEDICATIONS:  No current facility-administered medications on file prior to encounter.     Current Outpatient Medications on File Prior to Encounter   Medication Sig Dispense Refill    amLODIPine (NORVASC) 5 MG tablet Take 5 mg by mouth once daily.      atorvastatin (LIPITOR) 40 MG tablet Take 40 mg by mouth once daily.      b complex vitamins tablet Take 1 tablet by mouth once daily.      calcium acetate (PHOSLO) 667 mg capsule Take 667 mg by mouth Daily.  6    docusate sodium (COLACE) 100 MG capsule Take 100 mg by mouth once daily.       dorzolamide-timolol 2-0.5% (COSOPT) 22.3-6.8 mg/mL ophthalmic solution Place 1 drop into both eyes 2 (two) times daily.        latanoprost 0.005 % ophthalmic solution Place 1 drop into both eyes every evening.       lidocaine 5 % Crea Apply 1 application topically every Mon, Wed, Fri.       warfarin (COUMADIN) 1 MG tablet Take 1 mg by mouth Daily.      [DISCONTINUED] warfarin (COUMADIN) 3 MG tablet Take 1.5 mg by mouth Daily. Pt takes 1/2 of the 3mg pill to equal dose of 1.5mg, same dose everyday         SCHEDULED MEDS:   [START ON 1/27/2022] aspirin  81 mg Oral Daily    atorvastatin  40 mg Oral Daily    calcium acetate(phosphat bind)  667 mg Oral Daily    docusate sodium  100 mg Oral Daily    dorzolamide-timolol 2-0.5%  1 drop Both Eyes BID    latanoprost  1 drop Both Eyes QHS    metoprolol tartrate  25 mg Oral BID    warfarin  1 mg Oral Daily       CONTINUOUS INFUSIONS:    PRN MEDS:acetaminophen, albuterol-ipratropium, dextrose 50%, dextrose 50%, glucagon (human recombinant), glucose, glucose, HYDROcodone-acetaminophen, melatonin, morphine, naloxone, ondansetron, polyethylene glycol, simethicone, sodium chloride 0.9%    LABS AND DIAGNOSTICS     CBC LAST 3 DAYS  Recent Labs   Lab 01/26/22  1056   WBC 6.29   RBC 3.85*   HGB 11.7*   HCT 37.4   MCV 97   MCH 30.4   MCHC 31.3*   RDW 13.4      MPV 9.3   GRAN 53.6  3.4   LYMPH 23.2  1.5   MONO 11.9  0.8   BASO 0.03   NRBC 0       COAGULATION LAST 3 DAYS  Recent Labs   Lab 01/26/22  1056   LABPT 24.9*   INR 2.4   APTT 34.5       CHEMISTRY LAST 3 DAYS  Recent Labs   Lab 01/26/22  1056      K 3.1*   CL 98   CO2 28   ANIONGAP 13   BUN 19   CREATININE 4.4*   GLU 76   CALCIUM 8.4*   MG 1.9   ALBUMIN 3.6   PROT 6.9   ALKPHOS 63   ALT 27   AST 23   BILITOT 0.9       CARDIAC PROFILE LAST 3 DAYS  Recent Labs   Lab 01/26/22  1056   *   TROPONINI 0.033       ENDOCRINE LAST 3 DAYS  No results for input(s): TSH, PROCAL in the last 168 hours.    LAST ARTERIAL BLOOD GAS  ABG  No results for input(s): PH, PO2, PCO2, HCO3, BE in the last 168 hours.    LAST 7 DAYS MICROBIOLOGY    Microbiology Results (last 7 days)     ** No results found for the last 168 hours. **          MOST RECENT IMAGING  X-Ray Chest AP Portable  Chest single view    CLINICAL DATA: Chest pain    FINDINGS: Comparison to June 25. Heart size is normal. The mediastinum is unremarkable. Pulmonary vasculature and interstitial markings are mildly prominent. No alveolar infiltrates or pleural effusions are identified. Osseous structures are within normal limits.    IMPRESSION:  1. Mild diffuse interstitial prominence suggesting borderline interstitial edema.  2. No other significant findings.    Electronically signed by:  Martin Woody MD  1/26/2022 11:29 AM [employer] Workstation: [id number]      ECHOCARDIOGRAM RESULTS (last 5)  Results for orders placed during the hospital encounter of 03/03/21    Echo Color Flow Doppler? Yes    Interpretation Summary  · The left ventricle is small with concentric remodeling and normal systolic function. The estimated ejection fraction is 63%  · The estimated PA systolic pressure is 38 mmHg.  · Grade II left ventricular diastolic dysfunction.  · Normal right ventricular size with normal right ventricular systolic function.  · Mild-to-moderate mitral regurgitation.  · Mild to moderate tricuspid regurgitation.  · Normal central venous pressure (3 mmHg).  · The patient converted from atrial fibrillation to normal sinus rhythm 03/03/2021      Results for orders placed during the hospital encounter of 12/13/19    Echo Color Flow Doppler? Yes    Interpretation Summary  · Mild concentric left ventricular hypertrophy.  · Normal left ventricular systolic function. The estimated ejection fraction is 70%  · Grade II (moderate) left ventricular diastolic dysfunction consistent with pseudonormalization.  · The estimated PA systolic pressure is 37 mm Hg  · No wall motion abnormalities.  · Normal right ventricular systolic function.  · Normal central venous pressure (3 mm Hg).  · Mild mitral sclerosis.  ·  Mild mitral regurgitation.  · Mild to moderate tricuspid regurgitation.      CURRENT/PREVIOUS VISIT EKG  Results for orders placed or performed during the hospital encounter of 01/26/22   EKG 12-lead    Collection Time: 01/26/22 11:45 AM    Narrative    Test Reason : R07.9,    Vent. Rate : 072 BPM     Atrial Rate : 072 BPM     P-R Int : 116 ms          QRS Dur : 074 ms      QT Int : 446 ms       P-R-T Axes : 000 029 -49 degrees     QTc Int : 488 ms    Normal sinus rhythm  ST and T wave abnormality, consider inferior ischemia  ST and T wave abnormality, consider anterolateral ischemia  Prolonged QT  Abnormal ECG  When compared with ECG of 26-JAN-2022 10:49,  Significant changes have occurred    Referred By: [email or web address]   SELF           Confirmed By:            ASSESSMENT/PLAN:     Active Hospital Problems    Diagnosis    *Atrial fibrillation with rapid ventricular response    Chest pain    ESRD (end stage renal disease)    Hypokalemia    Coronary artery disease of native heart with stable angina pectoris       ASSESSMENT & PLAN:   1.  Chest pain during dialysis  2.  Hypotension during dialysis  3.  Paroxysmal atrial fibrillation,/AFib with RVR  4.  Essential hypertension  5.  Mixed hyperlipidemia  6.  End-stage renal disease on hemodialysis  7.  Hypokalemia      RECOMMENDATIONS:  1.  Patient has had chest pain essentially would rule out coronary artery disease will do workup ischemia workup including cardiac troponins.  2.  Schedule her for Lexiscan Cardiolite to evaluate for ischemia.  3.  2D echocardiogram for LV function ejection fraction and wall motion abnormality  4.  Patient is currently on Coumadin may need to hold her Coumadin.  Will start her on Lovenox  5.  Continue aspirin continue metoprolol tartrate 25 mg p.o. b.i.d..  Would replace potassium and aim to keep her potassium at 4.0 if possible probably needs a 4 potassium bath during hemodialysis.  6.  Further recommendations to follow will closely  follow with you thank you for the consultation.        Gino Leal MD  [facility]  Department of Cardiology  Date of Service: 01/26/2022  5:36 PM

## 2022-04-27 NOTE — TELEPHONE ENCOUNTER
Protocol failed     Requesting: amlodipine 5mg     LOV:1/3/22 with KS   # HTN: tolerating lisinopril  RTC: 3-4 months   Filled: 5/10/21 #90 3 refills   Recent Labs: 4/15/22     Upcoming OV: none scheduled

## 2022-05-02 PROBLEM — R79.89 TROPONIN I ABOVE REFERENCE RANGE: Status: RESOLVED | Noted: 2018-11-22 | Resolved: 2022-05-02

## 2022-05-02 PROBLEM — E11.65 UNCONTROLLED TYPE 2 DIABETES MELLITUS WITH HYPERGLYCEMIA (HCC): Status: ACTIVE | Noted: 2022-05-02

## 2022-05-02 PROBLEM — R73.9 HYPERGLYCEMIA: Status: RESOLVED | Noted: 2018-11-22 | Resolved: 2022-05-02

## 2022-05-02 PROBLEM — I16.1 HYPERTENSIVE EMERGENCY WITHOUT CONGESTIVE HEART FAILURE: Status: RESOLVED | Noted: 2018-11-22 | Resolved: 2022-05-02

## 2022-05-02 PROBLEM — I25.10 CORONARY ARTERY DISEASE INVOLVING NATIVE CORONARY ARTERY OF NATIVE HEART WITHOUT ANGINA PECTORIS: Status: ACTIVE | Noted: 2022-05-02

## 2022-05-02 NOTE — PATIENT INSTRUCTIONS
Please start over the counter calcium (1,000 to 1,200 mg daily) and vitamin D3 (2,000 IU daily) for bone health. Please use [facility] or call Michael Robles at [phone] to set up the following tests:  - blood work & urine test (does not require fasting)    Diabetes:  - increase metformin to  mg tablets twice a day with meals (breakfast & dinner)  - focus on a diet consisting of lean or plant based proteins, fruits, veggies, and whole grains  - regular exercise (30 minutes daily)    High blood pressure:  - discuss with Dr. Vonda Chiu at visit tomorrow    Follow up visit in 3 months.

## 2022-05-05 NOTE — TELEPHONE ENCOUNTER
Spoke with patient -- question regarding when to do labs.   She'll do these just prior to her f/u visit in August.

## 2022-05-05 NOTE — TELEPHONE ENCOUNTER
Pt called requesting to speak with Beto Counts directly regarding a questions on her labs. Pt would not go further as to what questions she has and states she just wants to speak with LL.     Pt states she does not want to speak with a nurse, only LL

## 2022-07-25 NOTE — TELEPHONE ENCOUNTER
Protocol passed     Requesting: amlodipine 5mg     LOV: 5/2/22 with LL   # HTN assoc with T2DM: above goal.  Seeing cardiology tomorrow and will discuss change in medication as she had recent echo done (result not currently available). Her home BP cuff IS accurate.   RTC: 3 months   Filled: 4/27/22 #90 0 refills   Recent Labs: 5/2/22     Upcoming OV: 8/1/22

## 2022-08-01 PROBLEM — R80.9 ALBUMINURIA: Status: ACTIVE | Noted: 2022-08-01

## 2022-08-01 NOTE — PATIENT INSTRUCTIONS
Please focus on a diet consisting of lean or plant based proteins, fruits, veggies, and whole grains, as well as regular exercise (start with 10 minutes of walking every day).     Chiropractor:  Dr. Bryanna Mas - [address], [place], [address] / Phone: [phone]  Dr. Michelle Tamayo [id number], [address] / Phone: [phone]    Please use [employer] or call [facility] Scheduling at [phone] to set up the following tests:  - fasting blood work - do this in November

## 2022-08-19 NOTE — TELEPHONE ENCOUNTER
Pt requesting metFORMIN 500 MG Oral Tab to be changed to a different dosage. Pt stated last OV she discussed this with LL and agreed to change to different dosage if pt requested. Please advise.

## 2022-08-22 NOTE — TELEPHONE ENCOUNTER
Rx for metformin 1,000 mg tablets sent to pharmacy (to take 1 tab twice a day with meals instead of two 500 mg tabs twice a day).

## 2022-08-22 NOTE — TELEPHONE ENCOUNTER
Refill request for CONTOUR NEXT TEST In Vitro Strip. Pt stated she has to call in for this prescription every time and she would like to know why it does not have refills at the pharmacy. Please advise.          [address], [address]  [address] [phone], [phone]   [address] [place] [address]   Phone: [phone] Fax: [phone]

## 2022-08-23 NOTE — TELEPHONE ENCOUNTER
LOV: 8/1/2022 with Oren Nolasco PA-C  RTC: 3 months  Last Relevant Labs: May/July 2022  Filled: 7/26/2022    #30 with 0 refills    No future appointments.

## 2022-12-12 NOTE — PATIENT INSTRUCTIONS
Blood pressure:  - increase amlodipine to 10 mg daily  - continue metoprolol and lisinopril as prescribed    Follow up visit with Augusto Quiñones in one month to recheck blood pressure. Repeat fasting labs in May and then see Augusto Quiñones for your wellness visit.

## 2023-01-16 NOTE — PATIENT INSTRUCTIONS
Please use [employer] or call Michael Dubosejah at [phone] to set up the following tests:  - fasting blood work (includes urine test) -- do this in mid March    Continue current medications. If labs are stable please see Terrell Núñez in May.

## 2023-04-28 NOTE — TELEPHONE ENCOUNTER
Incoming notice austyn MARTINS for test strips - need to call pharmacy to see if possible alt brand is covered for patient.

## 2023-05-01 NOTE — TELEPHONE ENCOUNTER
Patient here for an in office appointment. She states pharmacy called her rx was ready for . Patient advised to call our office back with any problems on rx.

## 2023-05-01 NOTE — PATIENT INSTRUCTIONS
Focus on a low carb diet -- more lean proteins, fresh fruits & veggies, whole grains. Do not skip meals. Increase exercise -- start with walking for 10 minutes every day (longer on days when you can).     Repeat a1c in August and then see Vidhya Holland for your wellness exam.

## 2023-05-22 NOTE — TELEPHONE ENCOUNTER
LOV: 5/1/2023 with Arik Gómez PA-C  RTC: 3 months  Last Relevant Labs: 4/22/2023  Filled: 2/27/2023    #180 with 0 refills    No future appointments.

## 2023-06-20 NOTE — TELEPHONE ENCOUNTER
Protocol passed     Requesting: amlodipine 10mg     LOV: 5/1/23   RTC: 3 months   Filled: 12/12/22 # 90 1 refill   Recent Labs: 4/22/23     No future appointments.

## 2023-08-07 NOTE — TELEPHONE ENCOUNTER
Patient would like to speak to someone about her lab results. Informed pt that they haven't been reviewed yet. Explained we have to wait until provider reviews them and then a nurse will call her back.

## 2023-08-16 NOTE — TELEPHONE ENCOUNTER
Patient called in requesting to get a cb. Pt is a bit confused why medication was sent to pharmacy since she spoke with LL that she did not want to take medication. empagliflozin 10 MG Oral Tab     Please advise.

## 2023-08-16 NOTE — PATIENT INSTRUCTIONS
Diabetes:  - continue metformin 1,000 mg - 1 tablet twice a day with meals    Repeat blood work in 3 months and then see Dr. Zhang Torres.

## 2023-08-16 NOTE — TELEPHONE ENCOUNTER
Spoke with pt. Pt stated she told LL she wanted to wait on Jardiance and I informed pt I can see in [address] notes that LL documented she is aware. Most likely an accident. Pt stated she called pharmacy already to cancel Jardiance. LL: OK to remove from pt's med list?    LOV 8/16--  # Uncontrolled type 2 DM: a1c 7.8 in 8/2023 on blood draw, 6.9 today on POC testing in office. Cont metformin 1,000 mg BID. discussed addition of Jardiance or GLP1, pt declines for now. Focus on diet & exercise.

## 2023-08-17 NOTE — TELEPHONE ENCOUNTER
Terrell Pearson removed from med list.  We agreed to hold off on starting this as in office A1c was at goal.

## 2023-08-22 NOTE — TELEPHONE ENCOUNTER
Patient states she is having trouble with pharmacy on prescription for test strips. She states she will be out of test strips soon. She is also asking for multiple refills.     She is asking that we send the prescription in as:    Glucose Blood (CONTOUR NEXT TEST) In Vitro Strip  100 each   Sig:   USE  STRIP TO Ha  [address] [phone], [phone]

## 2023-11-22 PROBLEM — R80.9 ALBUMINURIA: Status: RESOLVED | Noted: 2022-08-01 | Resolved: 2023-11-22

## 2023-11-22 NOTE — PATIENT INSTRUCTIONS
We will switch your metformin to 500 mg Extended Release to be taken once a day. We will add jardiance 10 mg daily. If you have any side effects please let me know    We will repeat your blood work today and then again in 1 month (orders are placed).  We will contact you once we have the test results     For the swelling try salt water gargles three times a day and let me know how you are doing

## 2023-11-24 NOTE — PROGRESS NOTES
Dear RN, please let the patient know   Cholesterol and thyroid function are normal  Inna Oconnell DO

## 2023-11-27 NOTE — PROGRESS NOTES
Dear RN, please let the patient know   I can do a VV, but she can just get blood work done first (in one month) and then we can decide about a visit  [address] [place] [address] DO

## 2023-12-20 NOTE — TELEPHONE ENCOUNTER
Patient called to request prescription for    empagliflozin (JARDIANCE) 10 MG Oral Tab    She has completed samples.      [address], [address] [facility]  [address] [facility] [phone], [phone]

## 2024-01-02 NOTE — PROGRESS NOTES
Dear RN, please let the patient know   1. A1c is about the same with mild improvement in protein in the urine. How is she tolerating the jardiance? We will repeat the tests again in 2 months  Inna Montalvo DO

## 2024-02-14 NOTE — TELEPHONE ENCOUNTER
Protocol PASS    Requesting: metFORMIN  MG Oral Tablet 24 Hr     LOV: 11/22/23  RTC:   Filled: 11/22/23 30 tablet 2 refill  Recent Labs: 11/22/23    Upcoming OV   No future appointments.

## 2024-03-07 NOTE — TELEPHONE ENCOUNTER
Patient called to inform she has been experiencing vaginal itching for appx 1 week. States itching is primarily at night. She has also been experiencing excessive urination. Patient feels she may be having a adverse reaction to Jardiance.   Please call and advise.

## 2024-03-07 NOTE — TELEPHONE ENCOUNTER
We will order a urine test checking for a UTI  I will order diflucan for possible yeast infection. She can do a trial of holding the jardiance as well    Inna Montalvo DO

## 2024-03-07 NOTE — TELEPHONE ENCOUNTER
Patient informed. WebEx from SV stating patient should hold jardiance for one week to see if symptoms improve. Patient v/u to all.

## 2024-03-07 NOTE — TELEPHONE ENCOUNTER
Patient c/o vaginal irritation/itchiness.     Discharge- no   odor-no   Chaffing- no   Burning- no  Blistering-no    Changed brand of pads but no new soaps.     Patient tried different otc agents with no relief.     States urination is frequent and is like a fire hydrant. Thinking it may be due to the jardiance.    Irritation is in the lips and clitoral norris. Occurs more at night.     Patient is really uncomfortable     Not sexually active.     Advised to switch back to original pads, keep area clean and dry. Wipe front to back. Avoid OTC creams at this time.     Any testing or creams you can proscribe to patient?

## 2024-03-12 NOTE — PROGRESS NOTES
Dear RN, please let the patient know   Her urine test is not consistent with an infection, but there is a lot of protein due to the diabetes. Please have her see me sooner. How are her symptoms?  Inna Montalvo DO

## 2024-03-13 NOTE — TELEPHONE ENCOUNTER
Spoke to patient regarding results. Not scheduled at this time. Per SV, if able patient can come in tomorrow 3/14 at 2:15 or Saturday 3/ 16 at 11:45.     Patient is asking if telephone call can be done or if in person is needed.     Her symptoms have improved.

## 2024-03-13 NOTE — TELEPHONE ENCOUNTER
Patient unable to VV. Per SV via WebEx okay to schedule patient.     Katia Inna 3/13/2024 11:43 AM  I can actually put her on 3/19 at 4 pm. I have already seen the 4 last week     Future Appointments   Date Time Provider Department Center   3/19/2024  4:00 PM Inna Montalvo DO [facility] [place]   4/13/2024  7:30 [facility]

## 2024-03-19 PROBLEM — E11.65 TYPE 2 DIABETES MELLITUS WITH HYPERGLYCEMIA (HCC): Status: ACTIVE | Noted: 2022-05-02

## 2024-03-19 PROBLEM — E11.65 UNCONTROLLED TYPE 2 DIABETES MELLITUS WITH HYPERGLYCEMIA (HCC): Status: RESOLVED | Noted: 2022-05-02 | Resolved: 2024-03-19

## 2024-03-19 NOTE — TELEPHONE ENCOUNTER
Protocol passed     Amlodipine 10mg     LOV: 11/22/23   RTC: 1 month   Filled; 12/19/23 #90   Labs: 11/18/23   Future Appointments   Date Time Provider Department Center   3/19/2024  4:00 PM Inna Montalvo,  [facility] [place]   4/13/2024  7:30 AM [facility] [place]

## 2024-03-19 NOTE — PROGRESS NOTES
Patient Office Visit    ASSESSMENT AND PLAN:   1. Type 2 diabetes mellitus with hyperglycemia, without long-term current use of insulin ([facility])  Note: Will stop Jardiance given her vaginal itchiness.  We talked about GLP-1 inhibitors but patient is not interested in any injectables.  Will continue with metformin at the current dose and add glipizide.  Will also increase lisinopril to 40 mg to help with the urine microalbumin.  Will repeat labs in 3 months.  - HEMOGLOBIN A1C  - lisinopril 40 MG Oral Tab; Take 1 tablet (40 mg total) by mouth daily.  Dispense: 90 tablet; Refill: 0  - glipiZIDE ER 5 MG Oral Tablet 24 Hr; Take 1 tablet (5 mg total) by mouth daily with breakfast.  Dispense: 90 tablet; Refill: 0  - Hemoglobin A1C [E]; Future  - Microalb/Creat Ratio, Random Urine; Future  - Basic Metabolic Panel (8) [E]; Future    2. Vaginitis due to Candida  Note: Likely due to Jardiance.  Jardiance will be stopped and will start Diflucan  - fluconazole (DIFLUCAN) 150 MG Oral Tab; Take 1 tablet (150 mg total) by mouth once for 1 dose.  Dispense: 1 tablet; Refill: 0    3. Menopausal vaginal dryness  Note: Discussed the side effects of the medicine.  Patient is interested in trying it.  - estradiol 0.1 MG/GM Vaginal Cream; Initial: 500 mg to 1 g/day intravaginally for 2 weeks, then 500 mg to 1 g one to three times per week  Dispense: 42.5 g; Refill: 0      4. Hypertension associated with diabetes (HCC)  Note: Will stop amlodipine and increase lisinopril.  Repeat labs in 2 weeks    Return to clinic in 3 months for follow-up    Patient/Caregiver Education: Patient/Caregiver Education: There are no barriers to learning. Medical education done. Outcome: Patient verbalizes understanding. Patient is notified to call with any questions, complications, allergies, or worsening or changing symptoms.  Patient is to call with any side effects or complications from the treatments as a result of today.      Reviewed Past Medical History and    Patient Active Problem List   Diagnosis    Obesity (BMI 30-39.9)    Microalbuminuria due to type 2 diabetes mellitus (HCC)    Hypertension associated with diabetes (HCC)    Hyperlipidemia associated with type 2 diabetes mellitus (HCC)    Coronary artery disease involving native coronary artery of native heart without angina pectoris    Type 2 diabetes mellitus with hyperglycemia (HCC)       Orders Placed This Encounter   Procedures    HEMOGLOBIN A1C     Order Specific Question:   Release to patient     Answer:   Immediate    Hemoglobin A1C [E]     Standing Status:   Future     Standing Expiration Date:   3/19/2025     Order Specific Question:   Release to patient     Answer:   Immediate    Microalb/Creat Ratio, Random Urine     Standing Status:   Future     Standing Expiration Date:   3/19/2025     Order Specific Question:   Release to patient     Answer:   Immediate    Basic Metabolic Panel (8) [E]     Standing Status:   Future     Standing Expiration Date:   3/19/2025     Order Specific Question:   Release to patient     Answer:   Immediate     Requested Prescriptions     Signed Prescriptions Disp Refills    lisinopril 40 MG Oral Tab 90 tablet 0     Sig: Take 1 tablet (40 mg total) by mouth daily.    fluconazole (DIFLUCAN) 150 MG Oral Tab 1 tablet 0     Sig: Take 1 tablet (150 mg total) by mouth once for 1 dose.    estradiol 0.1 MG/GM Vaginal Cream 42.5 g 0     Sig: Initial: 500 mg to 1 g/day intravaginally for 2 weeks, then 500 mg to 1 g one to three times per week    glipiZIDE ER 5 MG Oral Tablet 24 Hr 90 tablet 0     Sig: Take 1 tablet (5 mg total) by mouth daily with breakfast.         Inna Montalvo DO  CC:  Follow up        HPI:   Priyanka Dickerson is a 63-year-old female who presents to discuss her test results    Diabetes: Has noticed that she was going to the bathroom more frequently and was having a lot of itching.  Last week she stopped the Jardiance and took fluconazole and her itching  resolved.  As soon as she restarted the Jardiance her itching came back again.  She has been stressed and has not been eating so well so she feels that her sugar is elevated but she does not want to do any injectables.  Vaginal dryness: She recently got  and had intercourse couple days ago but it was very painful.  She has not had intercourse in 20+ years and would like a cream to help with the symptoms    Past Medical History:   Diagnosis Date    Diabetes (HCC)     Essential hypertension     Hyperlipidemia        Past Surgical History:   Procedure Laterality Date    ANGIOPLASTY (CORONARY)  2018    stent x3          x1       Social History:  Social History     Socioeconomic History    Marital status:    Tobacco Use    Smoking status: Never    Smokeless tobacco: Never   Vaping Use    Vaping Use: Never used   Substance and Sexual Activity    Alcohol use: No    Drug use: No   Other Topics Concern    Caffeine Concern No    Exercise No     Family History:  Family History   Problem Relation Age of Onset    Diabetes Father      Allergies:  Allergies   Allergen Reactions    Atorvastatin MYALGIA    Jardiance [Empagliflozin] ITCHING     Current Meds:  Current Outpatient Medications on File Prior to Visit   Medication Sig Dispense Refill    metFORMIN  MG Oral Tablet 24 Hr Take 1 tablet (500 mg total) by mouth daily. 30 tablet 0    APPLE CIDER VINEGAR OR Take 1 tablet by mouth daily.      Glucose Blood (CONTOUR NEXT TEST) In Vitro Strip USE  STRIP TO CHECK GLUCOSE THREE TIMES DAILY 100 each 5    rosuvastatin 10 MG Oral Tab Take 1 tablet (10 mg total) by mouth daily.      Metoprolol Succinate ER 25 MG Oral Tablet 24 Hr Take 1 tablet (25 mg total) by mouth daily.      ONETOUCH DELICA LANCETS 33G Does not apply Misc Use one lancet for each blood glucose test. Test 3 x week in the AM before breakfast, and 2 x a week 2 hours after large meal. 100 each 3    aspirin 81 MG Oral Tab EC Take 1 tablet (81 mg  total) by mouth daily. 30 tablet 11     No current facility-administered medications on file prior to visit.         REVIEW OF SYSTEMS   Constitutional: no fatigue normal energy no weight changes   HENT: normal sinuses and no mouth issues   Eyes: . normal vision no eye pain   Respiratory: normal respirations no cough   Cardiovascular: no CP, or palpitations   Gastrointestinal: normal bowels and no abd pains   Genitourinary:  normal urination no hematuria, no frequency   Musculoskeletal: no pains in arms/legs, normal range of motion   Skin: no rashes or skin lesions that are new   Neurological:  no weakness, no numbness, normal gait   Hematological:  no bruises or bleeding   Psychiatric/Behavioral: normal mood no anxiety normal behavior     /75 (BP Location: Right arm, Patient Position: Sitting, Cuff Size: adult)   Pulse 74   Temp 98 °F (36.7 °C) (Skin)   Resp 16   Ht 5' 2\" (1.575 m)   Wt 181 lb 6.4 oz (82.3 kg)   SpO2 97%   BMI 33.18 kg/m²     PHYSICAL EXAM:   Constitutional: Vital signs reviewed as noted, well developed, in no acute distress.   HENT: NCAT  Eyes: pupils reactive bilaterally  Cardiovascular: nl s1 s2 no m/r/g  Pulmonary/Chest: CTA bilaterally with no wheezes  Extremities: no pedal edema   Neurological:  no weakness in UE and LE, reflexes are normal  Skin: no rashes or bruises on visualized skin, not undressed   Psychiatric:normal mood

## 2024-05-06 NOTE — TELEPHONE ENCOUNTER
PASS    Future Appointments   Date Time Provider Department Center   6/1/2024 11:00 AM Inna Montalvo,  [facility] [place]

## 2024-05-13 NOTE — TELEPHONE ENCOUNTER
Protocol PASS    Requesting: metFORMIN  MG Oral Tablet 24 Hr     LOV: 3/19/24  RTC: 3 MONTHS  Filled: 2/15/24 30 TABLET 0 REFILL  Recent Labs: 3/9/24    Upcoming OV   Future Appointments   Date Time Provider Department Center   6/1/2024 11:00 AM Inna Montalvo DO [facility] [place]

## 2024-06-01 ENCOUNTER — LAB ENCOUNTER (OUTPATIENT)
Dept: LAB | Age: 64
End: 2024-06-01
Attending: INTERNAL MEDICINE
Payer: COMMERCIAL

## 2024-06-01 ENCOUNTER — OFFICE VISIT (OUTPATIENT)
Dept: INTERNAL MEDICINE CLINIC | Facility: CLINIC | Age: 64
End: 2024-06-01
Payer: COMMERCIAL

## 2024-06-01 VITALS
RESPIRATION RATE: 16 BRPM | HEART RATE: 56 BPM | OXYGEN SATURATION: 96 % | WEIGHT: 189 LBS | BODY MASS INDEX: 34.78 KG/M2 | SYSTOLIC BLOOD PRESSURE: 150 MMHG | HEIGHT: 62 IN | DIASTOLIC BLOOD PRESSURE: 78 MMHG | TEMPERATURE: 98 F

## 2024-06-01 DIAGNOSIS — E11.29 MICROALBUMINURIA DUE TO TYPE 2 DIABETES MELLITUS (HCC): ICD-10-CM

## 2024-06-01 DIAGNOSIS — E11.65 TYPE 2 DIABETES MELLITUS WITH HYPERGLYCEMIA, WITHOUT LONG-TERM CURRENT USE OF INSULIN (HCC): Primary | ICD-10-CM

## 2024-06-01 DIAGNOSIS — I15.2 HYPERTENSION ASSOCIATED WITH DIABETES (HCC): ICD-10-CM

## 2024-06-01 DIAGNOSIS — E11.59 HYPERTENSION ASSOCIATED WITH DIABETES (HCC): ICD-10-CM

## 2024-06-01 DIAGNOSIS — E11.65 TYPE 2 DIABETES MELLITUS WITH HYPERGLYCEMIA, WITHOUT LONG-TERM CURRENT USE OF INSULIN (HCC): ICD-10-CM

## 2024-06-01 DIAGNOSIS — R80.9 MICROALBUMINURIA DUE TO TYPE 2 DIABETES MELLITUS (HCC): ICD-10-CM

## 2024-06-01 DIAGNOSIS — E11.65 UNCONTROLLED TYPE 2 DIABETES MELLITUS WITH HYPERGLYCEMIA (HCC): ICD-10-CM

## 2024-06-01 PROBLEM — Z53.20 COLON CANCER SCREENING DECLINED: Status: ACTIVE | Noted: 2024-06-01

## 2024-06-01 PROBLEM — Z53.20: Status: ACTIVE | Noted: 2024-06-01

## 2024-06-01 PROBLEM — Z53.20 CERVICAL CANCER SCREENING DECLINED: Status: ACTIVE | Noted: 2024-06-01

## 2024-06-01 PROBLEM — Z53.20 MAMMOGRAM DECLINED: Status: ACTIVE | Noted: 2024-06-01

## 2024-06-01 LAB
EST. AVERAGE GLUCOSE BLD GHB EST-MCNC: 163 MG/DL (ref 68–126)
HBA1C MFR BLD: 7.3 % (ref ?–5.7)

## 2024-06-01 PROCEDURE — 83036 HEMOGLOBIN GLYCOSYLATED A1C: CPT

## 2024-06-01 PROCEDURE — G2211 COMPLEX E/M VISIT ADD ON: HCPCS | Performed by: INTERNAL MEDICINE

## 2024-06-01 PROCEDURE — 99213 OFFICE O/P EST LOW 20 MIN: CPT | Performed by: INTERNAL MEDICINE

## 2024-06-01 PROCEDURE — 3052F HG A1C>EQUAL 8.0%<EQUAL 9.0%: CPT | Performed by: INTERNAL MEDICINE

## 2024-06-01 PROCEDURE — 3008F BODY MASS INDEX DOCD: CPT | Performed by: INTERNAL MEDICINE

## 2024-06-01 PROCEDURE — 3078F DIAST BP <80 MM HG: CPT | Performed by: INTERNAL MEDICINE

## 2024-06-01 PROCEDURE — 3077F SYST BP >= 140 MM HG: CPT | Performed by: INTERNAL MEDICINE

## 2024-06-01 RX ORDER — AMLODIPINE BESYLATE 5 MG/1
5 TABLET ORAL DAILY
Qty: 90 TABLET | Refills: 0 | Status: SHIPPED | OUTPATIENT
Start: 2024-06-01 | End: 2025-05-27

## 2024-06-01 NOTE — PROGRESS NOTES
Patient Office Visit    ASSESSMENT AND PLAN:   1. Type 2 diabetes mellitus with hyperglycemia, without long-term current use of insulin ([facility])  Note: Patient had blood test done today and will await results.  For now we will continue with metformin and glipizide.  Patient was on Jardiance however it was stopped as patient was having recurring yeast infections and vaginal dryness.  Patient does not want to take any GLP-1 inhibitors.  She understands the benefits of good diabetes control.  She will have an appointment with her podiatrist coming up for a foot exam    2. Hypertension associated with diabetes ([facility])  Note: Will continue with lisinopril 40 mg and will add amlodipine as the blood pressure is elevated.  It is unclear if the blood pressure is elevated due to the dye she received in the ophthalmologist office.  Recommended patient to check her blood pressure at home and send a log in the next week.    3. Microalbuminuria due to type 2 diabetes mellitus ([facility])  Note: Patient will repeat her lab test in a few days as after receiving a dye her urine turns red.    Return to clinic in 1 month for a blood pressure check    Patient/Caregiver Education: Patient/Caregiver Education: There are no barriers to learning. Medical education done. Outcome: Patient verbalizes understanding. Patient is notified to call with any questions, complications, allergies, or worsening or changing symptoms.  Patient is to call with any side effects or complications from the treatments as a result of today.      Reviewed Past Medical History and   Patient Active Problem List   Diagnosis    Obesity (BMI 30-39.9)    Microalbuminuria due to type 2 diabetes mellitus (HCC)    Hypertension associated with diabetes ([facility])    Hyperlipidemia associated with type 2 diabetes mellitus ([facility])    Coronary artery disease involving native coronary artery of native heart without angina pectoris    Type 2 diabetes mellitus with hyperglycemia ([facility])     Mammogram declined    Cervical cancer screening declined    Colon cancer screening declined    Osteoporosis monitoring declined       No orders of the defined types were placed in this encounter.    Requested Prescriptions      No prescriptions requested or ordered in this encounter         Inna Montalvo DO  CC:  Chief Complaint   Patient presents with    Diabetes     DM check         HPI:   Priyanka Bowman is a 63-year-old female who presents for follow-up    Diabetes: She has noticed that her blood sugars have been in the 105 to 160s.  She did notice that she was losing weight on the Jardiance but after stopping the medicine her vaginal dryness has improved.  Since starting glipizide she has noticed some weight gain as well.  She is still not interested in any GLP-1 inhibitors.  She had blood test done today.  Hypertension: Prior to coming in she saw her eye doctor and had it placed to check her eyes more closely.  She is not sure if the diet caused her blood pressure to rise.  She does not check her blood pressure at home and is tolerating lisinopril well.  We had stopped her amlodipine last visit as we had increased her lisinopril due to the microalbuminuria.    Past Medical History:    Diabetes (HCC)    Essential hypertension    Hyperlipidemia       Past Surgical History:   Procedure Laterality Date    Angioplasty (coronary)  2018    stent x3          x1       Social History:  Social History     Socioeconomic History    Marital status:    Tobacco Use    Smoking status: Never    Smokeless tobacco: Never   Vaping Use    Vaping status: Never Used   Substance and Sexual Activity    Alcohol use: No    Drug use: No   Other Topics Concern    Caffeine Concern No    Exercise No     Social Determinants of Health     Physical Activity: Insufficiently Active (2020)    Received from [employer] [facility]    Exercise Vital Sign     Days of Exercise per Week: 2 days      Minutes of Exercise per Session: 30 min     Family History:  Family History   Problem Relation Age of Onset    Diabetes Father      Allergies:  Allergies   Allergen Reactions    Atorvastatin MYALGIA    Jardiance [Empagliflozin] ITCHING     Current Meds:  Current Outpatient Medications on File Prior to Visit   Medication Sig Dispense Refill    METFORMIN  MG Oral Tablet 24 Hr Take 1 tablet by mouth once daily 90 tablet 0    CONTOUR NEXT TEST In Vitro Strip USE 1 STRIP TO CHECK GLUCOSE THREE TIMES DAILY 100 each 0    lisinopril 40 MG Oral Tab Take 1 tablet (40 mg total) by mouth daily. 90 tablet 0    glipiZIDE ER 5 MG Oral Tablet 24 Hr Take 1 tablet (5 mg total) by mouth daily with breakfast. 90 tablet 0    APPLE CIDER VINEGAR OR Take 1 tablet by mouth daily.      rosuvastatin 10 MG Oral Tab Take 1 tablet (10 mg total) by mouth daily.      Metoprolol Succinate ER 25 MG Oral Tablet 24 Hr Take 1 tablet (25 mg total) by mouth daily.      ONETOUCH DELICA LANCETS 33G Does not apply Misc Use one lancet for each blood glucose test. Test 3 x week in the AM before breakfast, and 2 x a week 2 hours after large meal. 100 each 3    aspirin 81 MG Oral Tab EC Take 1 tablet (81 mg total) by mouth daily. 30 tablet 11     No current facility-administered medications on file prior to visit.         REVIEW OF SYSTEMS   Constitutional: no fatigue normal energy no weight changes   HENT: normal sinuses and no mouth issues   Eyes: . normal vision no eye pain   Respiratory: normal respirations no cough   Cardiovascular: no CP, or palpitations   Gastrointestinal: normal bowels and no abd pains   Genitourinary:  normal urination no hematuria, no frequency   Musculoskeletal: no pains in arms/legs, normal range of motion   Skin: no rashes or skin lesions that are new   Neurological:  no weakness, no numbness, normal gait   Hematological:  no bruises or bleeding   Psychiatric/Behavioral: normal mood no anxiety normal behavior     /78    Pulse 56   Temp 97.9 °F (36.6 °C) (Temporal)   Resp 16   Ht 5' 2\" (1.575 m)   Wt 189 lb (85.7 kg)   SpO2 96%   BMI 34.57 kg/m²     PHYSICAL EXAM:   Constitutional: Vital signs reviewed as noted, well developed, in no acute distress.   HENT: NCAT  Eyes: pupils reactive bilaterally  Cardiovascular: nl s1 s2 no m/r/g  Pulmonary/Chest: CTA bilaterally with no wheezes  Extremities: no pedal edema   Neurological:  no weakness in UE and LE, reflexes are normal. Bilateral barefoot skin diabetic exam is normal, visualized feet and the appearance is normal.  Bilateral monofilament/sensation of both feet is normal.  Pulsation pedal pulse exam of both lower legs/feet is normal as well.  Skin: no rashes or bruises on visualized skin, not undressed   Psychiatric:normal mood

## 2024-06-01 NOTE — PATIENT INSTRUCTIONS
Lets start the amlodipine at night time    I will get in touch with you after your blood tests    We will call you with your next appointment

## 2024-06-03 NOTE — PROGRESS NOTES
Dear RN, please let the patient know   1.  Her A1c has improved from before.  Lets continue with the current regimen.  Inna Montalvo DO

## 2024-06-05 DIAGNOSIS — E11.65 TYPE 2 DIABETES MELLITUS WITH HYPERGLYCEMIA, WITHOUT LONG-TERM CURRENT USE OF INSULIN (HCC): ICD-10-CM

## 2024-06-14 DIAGNOSIS — E11.65 TYPE 2 DIABETES MELLITUS WITH HYPERGLYCEMIA, WITHOUT LONG-TERM CURRENT USE OF INSULIN (HCC): ICD-10-CM

## 2024-06-14 RX ORDER — GLIPIZIDE 5 MG/1
5 TABLET, FILM COATED, EXTENDED RELEASE ORAL
Qty: 90 TABLET | Refills: 0 | Status: SHIPPED | OUTPATIENT
Start: 2024-06-14

## 2024-06-14 RX ORDER — LISINOPRIL 40 MG/1
40 TABLET ORAL DAILY
Qty: 90 TABLET | Refills: 0 | Status: SHIPPED | OUTPATIENT
Start: 2024-06-14

## 2024-06-14 NOTE — TELEPHONE ENCOUNTER
Protocol FAIL    Requesting:   lisinopril 40 MG Oral Tab 3/19/24 90 TABLET 0 REFILL  glipiZIDE ER 5 MG Oral Tablet 24 Hr 3/19/24 90 TABLET 0 REFILL    LOV: 6/1/24  RTC: 1 MONTH  Recent Labs: 6/1/24    Upcoming OV   Future Appointments   Date Time Provider Department Center   7/13/2024 11:00 AM Inna Montalvo DO [facility] [place]          TREATMENT PLAN:    1. MEDICATIONS:   Increase lamotrigine  to 100 mg  Discontinue quetiapine, can take 25 mg nightly for 4 nights and then discontinue.  Start trazodone 25 to 50 mg as needed insomnia     2. CONTINGENCY PLAN:  Consider Further increasing the lamotrigine if needed to 200 mg     3. CONSULTS/REFERRALS:   Continue therapy with [facility]     4. LABS/PROCEDURES:   None at this time     5. FOLLOW UP: Schedule an appointment with me in five weeks or sooner as needed. Call [phone] ([phone]) to schedule  Follow up with primary care provider as planned or for acute medical concerns.  Call the psychiatric nurse line with medication questions or concerns at [phone].     6. PSYCHOEDUCATION:  Medication side effects and alternatives reviewed. Health promotion activities recommended and reviewed today. All questions addressed. Education and counseling completed regarding risks and benefits of medications and psychotherapy options.  Call the psychiatric nurse line with medication questions or concerns at [phone].  [employer] may be used to communicate with your provider, but this is not intended to be used for emergencies.  Lamotrigine: Discussed risk of rash and instructed to stop taking the drug at the first sign of a rash regardless of its type or severity, and contact the nurse line at [phone]     COMMUNITY RESOURCES:    National Suicide Prevention Lifeline: 561.187.1892 (TTY: 449.318.5304). Call anytime for help.  (www.suicidepreventionlifeline.org)  National Protection on Mental Illness (www.ana.org): 898.550.9427 or 801-643-1685.   Mental Health Association (www.mentalhealth.org): 355.803.7854 or 939-955-0408.  [place] Crisis Text Line: Text [place] to 011558  Suicide LifeLine Chat: suicidepreFundedByMeline.org/chat

## 2024-06-17 ENCOUNTER — TELEPHONE (OUTPATIENT)
Dept: INTERNAL MEDICINE CLINIC | Facility: CLINIC | Age: 64
End: 2024-06-17

## 2024-06-17 NOTE — TELEPHONE ENCOUNTER
Patient called the office stating that an injectible medication was previously discussed in regards to managing her diabetes. Patient is requesting information on the drug that Dr Montalvo is proposing to start with, so she may do some research on it as patient states she would like to move forward with that and is gaining weight from her current medication. Please call back at [phone] and advise.

## 2024-06-17 NOTE — TELEPHONE ENCOUNTER
Attempted to call patient - no answer.   Can check if insurance will cover Mounjaro, Ozempic, Trulicity then we can ask Dr Montalvo which she recommends for patient.     LOV   6/1/2024 Dr Montalvo   1. Type 2 diabetes mellitus with hyperglycemia, without long-term current use of insulin ([facility])  Note: Patient had blood test done today and will await results.  For now we will continue with metformin and glipizide.  Patient was on Jardiance however it was stopped as patient was having recurring yeast infections and vaginal dryness.  Patient does not want to take any GLP-1 inhibitors.  She understands the benefits of good diabetes control.  She will have an appointment with her podiatrist coming up for a foot exam

## 2024-06-19 ENCOUNTER — LAB ENCOUNTER (OUTPATIENT)
Dept: LAB | Age: 64
End: 2024-06-19
Attending: INTERNAL MEDICINE

## 2024-06-19 DIAGNOSIS — E11.65 TYPE 2 DIABETES MELLITUS WITH HYPERGLYCEMIA, WITHOUT LONG-TERM CURRENT USE OF INSULIN (HCC): ICD-10-CM

## 2024-06-19 LAB
CREAT UR-SCNC: 163.2 MG/DL
MICROALBUMIN UR-MCNC: 63.9 MG/DL
MICROALBUMIN/CREAT 24H UR-RTO: 391.5 UG/MG (ref ?–30)

## 2024-06-19 PROCEDURE — 82570 ASSAY OF URINE CREATININE: CPT | Performed by: INTERNAL MEDICINE

## 2024-06-19 PROCEDURE — 82043 UR ALBUMIN QUANTITATIVE: CPT | Performed by: INTERNAL MEDICINE

## 2024-06-20 DIAGNOSIS — R80.9 MICROALBUMINURIA DUE TO TYPE 2 DIABETES MELLITUS (HCC): Primary | ICD-10-CM

## 2024-06-20 DIAGNOSIS — E11.29 MICROALBUMINURIA DUE TO TYPE 2 DIABETES MELLITUS (HCC): Primary | ICD-10-CM

## 2024-06-20 NOTE — PROGRESS NOTES
Dear RN, please let the patient know   Her urine test still shows the protein but it has improved from last visit.  With continued improvement in diabetes and blood pressure the protein levels should improve.  We will continue to monitor every 3 months  Inna Montalvo DO

## 2024-06-26 ENCOUNTER — TELEPHONE (OUTPATIENT)
Dept: INTERNAL MEDICINE CLINIC | Facility: CLINIC | Age: 64
End: 2024-06-26

## 2024-06-26 NOTE — TELEPHONE ENCOUNTER
S/w pt.  Last appt was up 7-9 lbs, as of now feels like atleast an additional 5 lbs. Pt states she Cannot fit in her clothes, \"stomach has exploded\". Pt feels all the weight gain in abdomen. Pt has not altered what she's eating. Not constipated, more loose stools than constipation. Pt states she is staying  hydrated.   Pt feels GLIPIZIDE ER 5 MG Oral Tablet 24 Hr is causing the weight gain.     Pt also stated she is itching at night. Full body itching, mostly back.   Itching off and on, but last 2 weeks more regularly. Benadryl on nights that are really bad, at least 2 x's/week.     Pt did research 3 injectable meds (Mounjaro, Ozempic, Trulicity ).  All covered by her insurance. But pt also read the possible side effects of making her eyes worse. Pt states she sees her eye doctor atleast 3 x's a month, and she is not willing to risk her eye sight worsening.       Pt is aware she has appt on 7/13.  Pt would like to know if there are Any other drug besides glipizide?   SV: discuss at July appt, or any further recs prior to appt?

## 2024-06-26 NOTE — TELEPHONE ENCOUNTER
S/w pt. She states she will speak with eye doctor. She will give update after she speaks with doctor.

## 2024-06-26 NOTE — TELEPHONE ENCOUNTER
Pt would like a call back as she has a question on her Glipizide, Pt states medication is making her go up in weight.

## 2024-06-26 NOTE — TELEPHONE ENCOUNTER
For oral it is generally glipizide/metformin or jardiance (caused UTI/yeast infections). Can she check with her eye doctor if he or she is opposed to considering ozempic/mounjaro?  Inna Montalvo DO

## 2024-07-13 ENCOUNTER — OFFICE VISIT (OUTPATIENT)
Dept: INTERNAL MEDICINE CLINIC | Facility: CLINIC | Age: 64
End: 2024-07-13
Payer: COMMERCIAL

## 2024-07-13 VITALS
RESPIRATION RATE: 16 BRPM | SYSTOLIC BLOOD PRESSURE: 148 MMHG | OXYGEN SATURATION: 99 % | BODY MASS INDEX: 34.63 KG/M2 | DIASTOLIC BLOOD PRESSURE: 72 MMHG | HEART RATE: 67 BPM | HEIGHT: 62 IN | WEIGHT: 188.19 LBS | TEMPERATURE: 98 F

## 2024-07-13 DIAGNOSIS — E11.29 MICROALBUMINURIA DUE TO TYPE 2 DIABETES MELLITUS (HCC): ICD-10-CM

## 2024-07-13 DIAGNOSIS — E11.65 TYPE 2 DIABETES MELLITUS WITH HYPERGLYCEMIA, WITHOUT LONG-TERM CURRENT USE OF INSULIN (HCC): ICD-10-CM

## 2024-07-13 DIAGNOSIS — E66.9 OBESITY (BMI 30-39.9): ICD-10-CM

## 2024-07-13 DIAGNOSIS — E11.59 HYPERTENSION ASSOCIATED WITH DIABETES (HCC): ICD-10-CM

## 2024-07-13 DIAGNOSIS — E11.65 TYPE 2 DIABETES MELLITUS WITH HYPERGLYCEMIA, WITHOUT LONG-TERM CURRENT USE OF INSULIN (HCC): Primary | ICD-10-CM

## 2024-07-13 DIAGNOSIS — R80.9 MICROALBUMINURIA DUE TO TYPE 2 DIABETES MELLITUS (HCC): ICD-10-CM

## 2024-07-13 DIAGNOSIS — I15.2 HYPERTENSION ASSOCIATED WITH DIABETES (HCC): ICD-10-CM

## 2024-07-13 PROBLEM — E11.3393 CONTROLLED TYPE 2 DIABETES MELLITUS WITH BOTH EYES AFFECTED BY MODERATE NONPROLIFERATIVE RETINOPATHY WITHOUT MACULAR EDEMA, WITHOUT LONG-TERM CURRENT USE OF INSULIN (HCC): Status: ACTIVE | Noted: 2024-07-13

## 2024-07-13 PROBLEM — E11.3393 MODERATE NONPROLIFERATIVE DIABETIC RETINOPATHY OF BOTH EYES WITHOUT MACULAR EDEMA ASSOCIATED WITH TYPE 2 DIABETES MELLITUS (HCC): Status: ACTIVE | Noted: 2024-07-13

## 2024-07-13 PROBLEM — E11.3393 CONTROLLED TYPE 2 DIABETES MELLITUS WITH BOTH EYES AFFECTED BY MODERATE NONPROLIFERATIVE RETINOPATHY WITHOUT MACULAR EDEMA, WITHOUT LONG-TERM CURRENT USE OF INSULIN (HCC): Status: RESOLVED | Noted: 2024-07-13 | Resolved: 2024-07-13

## 2024-07-13 LAB
CARTRIDGE EXPIRATION DATE: 2026 DATE
CARTRIDGE LOT#: 7166 NUMERIC
HEMOGLOBIN A1C: 7.2 % (ref 4.3–5.6)

## 2024-07-13 RX ORDER — TIRZEPATIDE 2.5 MG/.5ML
2.5 INJECTION, SOLUTION SUBCUTANEOUS WEEKLY
Qty: 2 ML | Refills: 0 | Status: SHIPPED | OUTPATIENT
Start: 2024-07-13

## 2024-07-13 NOTE — PROGRESS NOTES
Patient Office Visit    ASSESSMENT AND PLAN:   1. Type 2 diabetes mellitus with hyperglycemia, without long-term current use of insulin ([facility])  Note: Will stop glipizide and continue metformin.  We discussed the risks and benefits of GLP-1 inhibitors versus insulin.  Patient is willing to try Mounjaro.  She denies any personal or family history of medullary thyroid cancer or pancreatitis.  Discussed that these medications can affect the eyes as well as cause biliary colic symptoms.  Patient willing to try and once approved patient will come in to schedule an appointment with the nurse to learn how to use it.  We discussed about the side effects of nausea, vomiting, constipation and diarrhea.  - Tirzepatide (MOUNJARO) 2.5 MG/0.5ML Subcutaneous Solution Pen-injector; Inject 2.5 mg into the skin once a week.  Dispense: 2 mL; Refill: 0  - HEMOGLOBIN A1C    2. Hypertension associated with diabetes (HCC)  Note: Patient was interested in trying a diuretic however we will start off with 1 medication at a time as patient does get side effects.  If blood pressure is still high then we can consider switching lisinopril to olmesartan     3. Microalbuminuria due to type 2 diabetes mellitus (HCC)  Note: Will continue lisinopril for now    4. Obesity (BMI 30-39.9)  Note: Continue with diet and lifestyle modifications and will add Mounjaro    Return to clinic in 6 weeks      Patient/Caregiver Education: Patient/Caregiver Education: There are no barriers to learning. Medical education done. Outcome: Patient verbalizes understanding. Patient is notified to call with any questions, complications, allergies, or worsening or changing symptoms.  Patient is to call with any side effects or complications from the treatments as a result of today.      Reviewed Past Medical History and   Patient Active Problem List   Diagnosis    Obesity (BMI 30-39.9)    Microalbuminuria due to type 2 diabetes mellitus (HCC)    Hypertension associated with  diabetes (HCC)    Hyperlipidemia associated with type 2 diabetes mellitus (HCC)    Coronary artery disease involving native coronary artery of native heart without angina pectoris    Type 2 diabetes mellitus with hyperglycemia (HCC)    Mammogram declined    Cervical cancer screening declined    Colon cancer screening declined    Osteoporosis monitoring declined    Moderate nonproliferative diabetic retinopathy of both eyes without macular edema associated with type 2 diabetes mellitus (HCC)       Orders Placed This Encounter   Procedures    HEMOGLOBIN A1C     Order Specific Question:   Release to patient     Answer:   Immediate     Requested Prescriptions     Signed Prescriptions Disp Refills    Tirzepatide (MOUNJARO) 2.5 MG/0.5ML Subcutaneous Solution Pen-injector 2 mL 0     Sig: Inject 2.5 mg into the skin once a week.         Inna Montalvo DO  CC:  No chief complaint on file.        HPI:   Priyanka Bowman is a 63-year-old female who presents for follow-up     Diabetes/hypertension: She has been going to the eye doctor every Saturday due to retinopathy.  She spoke to her eye doctor and they discussed about Mounjaro/Ozempic/insulin.  Eye doctor asked her why she is not on insulin yet and the patient said that she does not want to use injections.  She has been reading about both Mounjaro and Ozempic and she is willing to start Mounjaro.  She really feels that the glipizide has been increasing her weight.  In regards to her hypertension she did increase the lisinopril to 40 mg however her blood pressure remains in the 140s.  She also feels that the weight could be contributing to the symptoms.  Past Medical History:    Diabetes (HCC)    Essential hypertension    Hyperlipidemia       Past Surgical History:   Procedure Laterality Date    Angioplasty (coronary)  2018    stent x3          x1       Social History:  Social History     Socioeconomic History    Marital status:    Tobacco Use    Smoking  status: Never    Smokeless tobacco: Never   Vaping Use    Vaping status: Never Used   Substance and Sexual Activity    Alcohol use: No    Drug use: No   Other Topics Concern    Caffeine Concern No    Exercise No     Social Determinants of Health     Physical Activity: Insufficiently Active (11/20/2020)    [facility]    Exercise Vital Sign     Days of Exercise per Week: 2 days     Minutes of Exercise per Session: 30 min     Family History:  Family History   Problem Relation Age of Onset    Diabetes Father      Allergies:  Allergies   Allergen Reactions    Atorvastatin MYALGIA    Jardiance [Empagliflozin] ITCHING     Current Meds:  Current Outpatient Medications on File Prior to Visit   Medication Sig Dispense Refill    LISINOPRIL 40 MG Oral Tab Take 1 tablet by mouth once daily 90 tablet 0    amLODIPine 5 MG Oral Tab Take 1 tablet (5 mg total) by mouth daily. 90 tablet 0    METFORMIN  MG Oral Tablet 24 Hr Take 1 tablet by mouth once daily 90 tablet 0    APPLE CIDER VINEGAR OR Take 1 tablet by mouth daily.      rosuvastatin 10 MG Oral Tab Take 1 tablet (10 mg total) by mouth daily.      Metoprolol Succinate ER 25 MG Oral Tablet 24 Hr Take 1 tablet (25 mg total) by mouth daily.      ONETOUCH DELICA LANCETS 33G Does not apply Misc Use one lancet for each blood glucose test. Test 3 x week in the AM before breakfast, and 2 x a week 2 hours after large meal. 100 each 3    aspirin 81 MG Oral Tab EC Take 1 tablet (81 mg total) by mouth daily. 30 tablet 11    [DISCONTINUED] CONTOUR NEXT TEST In Vitro Strip USE 1 STRIP TO CHECK GLUCOSE THREE TIMES DAILY 100 each 0     No current facility-administered medications on file prior to visit.         REVIEW OF SYSTEMS   Constitutional: no fatigue normal energy no weight changes   HENT: normal sinuses and no mouth issues   Eyes: . normal vision no eye pain   Respiratory: normal respirations no cough   Cardiovascular: no CP, or  palpitations   Gastrointestinal: normal bowels and no abd pains   Genitourinary:  normal urination no hematuria, no frequency   Musculoskeletal: no pains in arms/legs, normal range of motion   Skin: no rashes or skin lesions that are new   Neurological:  no weakness, no numbness, normal gait   Hematological:  no bruises or bleeding   Psychiatric/Behavioral: normal mood no anxiety normal behavior     /72 (BP Location: Left arm, Patient Position: Sitting, Cuff Size: adult)   Pulse 67   Temp 97.9 °F (36.6 °C) (Temporal)   Resp 16   Ht 5' 2\" (1.575 m)   Wt 188 lb 3.2 oz (85.4 kg)   SpO2 99%   BMI 34.42 kg/m²     PHYSICAL EXAM:   Constitutional: Vital signs reviewed as noted, well developed, in no acute distress.   HENT: NCAT  Eyes: pupils reactive bilaterally  Cardiovascular: nl s1 s2 no m/r/g  Pulmonary/Chest: CTA bilaterally with no wheezes  Extremities: no pedal edema   Neurological:  no weakness in UE and LE, reflexes are normal  Skin: no rashes or bruises on visualized skin, not undressed   Psychiatric:normal mood

## 2024-07-13 NOTE — PATIENT INSTRUCTIONS
Lets see if Mounjaro is covered.  We will start off on the lowest dose then we will increase the dose in 1 month.  Continue follow-up with the eye doctor.  We discussed about the side effects of nausea, vomiting, constipation and diarrhea.  You mentioned no personal or family history of pancreatitis or thyroid cancer    I will see you back on August 17    Continue the rest of your medications except glipizide.

## 2024-07-18 ENCOUNTER — NURSE ONLY (OUTPATIENT)
Dept: INTERNAL MEDICINE CLINIC | Facility: CLINIC | Age: 64
End: 2024-07-18
Payer: COMMERCIAL

## 2024-07-18 NOTE — PROGRESS NOTES
Reviewed Mounjaro self administration with patient. Showed patient how to use then had patient demonstrate herself. Patient was confident she can self administer. Answered all questions.

## 2024-07-22 ENCOUNTER — TELEPHONE (OUTPATIENT)
Dept: INTERNAL MEDICINE CLINIC | Facility: CLINIC | Age: 64
End: 2024-07-22

## 2024-07-22 NOTE — TELEPHONE ENCOUNTER
Patient called in and said she started her Tirzepatide (MOUNJARO) 2.5 MG/0.5ML Subcutaneous Solution     She ended up stabbing her MUNJARO in her R index finger.    Patient states her finger was swollen but now it's ok.     Patient is concerned of her missing dosage.     Please advise.     # [phone]

## 2024-07-22 NOTE — TELEPHONE ENCOUNTER
Called patient to clarify, she had the pen the wrong way and injected some of the medicine into her finger. She states there was some blood/bruising and swelling but overall she feels fine.  She is concerned because she had to use an extra pen and wondering what the next steps would be.  Advised patient to skip the next dose and continue the following week.  Patient verbalized understanding to all. Agree?

## 2024-08-04 DIAGNOSIS — E11.65 TYPE 2 DIABETES MELLITUS WITH HYPERGLYCEMIA, WITHOUT LONG-TERM CURRENT USE OF INSULIN (HCC): ICD-10-CM

## 2024-08-05 RX ORDER — TIRZEPATIDE 2.5 MG/.5ML
2.5 INJECTION, SOLUTION SUBCUTANEOUS WEEKLY
Qty: 2 ML | Refills: 0 | Status: SHIPPED | OUTPATIENT
Start: 2024-08-05

## 2024-08-05 RX ORDER — TIRZEPATIDE 2.5 MG/.5ML
2.5 INJECTION, SOLUTION SUBCUTANEOUS WEEKLY
Qty: 4 ML | Refills: 0 | Status: SHIPPED | OUTPATIENT
Start: 2024-08-05 | End: 2024-08-05

## 2024-08-05 NOTE — TELEPHONE ENCOUNTER
Requesting    Name from pharmacy: Mounjaro 2.5 MG/0.5ML Subcutaneous Solution Pen-injector         Will file in chart as: MOUNJARO 2.5 MG/0.5ML Subcutaneous Solution Pen-injector    Sig: INJECT 2.5MG INTO THE SKIN ONCE A WEEK    Disp: 4 mL    Refills: 0    Start: 8/4/2024    Class: Normal    Non-formulary For: Type 2 diabetes mellitus with hyperglycemia, without long-term current use of insulin (HCC)    Last ordered: 3 weeks ago (7/13/2024) by Inna Montalvo DO    Last refill: 7/13/2024    Rx #: [id number]    Diabetes Medication Protocol [id number]/04/2024 12:00 PM   Protocol Details Last A1C < 7.5 and within past 6 months    In person appointment or virtual visit in the past 6 mos or appointment in next 3 mos    Microalbumin procedure in past 12 months or taking ACE/ARB    EGFRCR or GFRAA > 50    GFR in the past 12 months      To be filled at: [address] [phone], [phone]        LOV: 07/13/24 w/ SV  RTC: 08/15/24  Last Relevant Labs: 06/2024  Filled: 07/13/24 #2mL with 0 refills    Future Appointments   Date Time Provider Department Center   8/17/2024 11:00 AM Inna Montalvo DO [facility] [place]

## 2024-08-07 ENCOUNTER — TELEPHONE (OUTPATIENT)
Dept: INTERNAL MEDICINE CLINIC | Facility: CLINIC | Age: 64
End: 2024-08-07

## 2024-08-07 DIAGNOSIS — E11.65 UNCONTROLLED TYPE 2 DIABETES MELLITUS WITH HYPERGLYCEMIA (HCC): ICD-10-CM

## 2024-08-07 NOTE — TELEPHONE ENCOUNTER
Patient called in and needs clarification on some medication.     METFORMIN  MG Oral Tablet 24 Hr 90 tablet     Tirzepatide (MOUNJARO) 2.5 MG/0.5ML Subcutaneous Solution Pen-injector 2 mL     Needs to know next steps regarding medication.   CB# [phone]

## 2024-08-07 NOTE — TELEPHONE ENCOUNTER
TJ, Dr Montalvo pt--  S/w pt. Pt stated that at LOV they discussed eventually stopping Metformin. Pt confirmed she stopped the glipizide and and has been taking Mounjaro for 3 weeks. Sunday is her last dose of Mounjaro. Informed pt that refill was sent in for 2.5 mg dose of Mounjaro on 8/5/24.     TJ: should Mounjaro refill be increased to 5mg instead of the 2.5 mg again?   Pt states she has about a week left of Metformin  mg tabs. Pt wasn't sure if she should refill Metformin for another 90 days? Or just a 30 day supply of Metformin? Or discontinue it?   TJ, please advise on next Metformin and Mounjaro. Thanks!    Advised pt we'll call her back with update on Medication refills and also let the pharmacy know. She v/u.     LOV 7/13/24--  Lets see if Mounjaro is covered.  We will start off on the lowest dose then we will increase the dose in 1 month.  Continue follow-up with the eye doctor.  We discussed about the side effects of nausea, vomiting, constipation and diarrhea.  You mentioned no personal or family history of pancreatitis or thyroid cancer     I will see you back on August 17     Continue the rest of your medications except glipizide.

## 2024-08-08 RX ORDER — TIRZEPATIDE 5 MG/.5ML
5 INJECTION, SOLUTION SUBCUTANEOUS WEEKLY
Qty: 2 ML | Refills: 0 | Status: SHIPPED | OUTPATIENT
Start: 2024-08-08 | End: 2024-08-30

## 2024-08-08 RX ORDER — METFORMIN HYDROCHLORIDE 500 MG/1
500 TABLET, EXTENDED RELEASE ORAL DAILY
Qty: 30 TABLET | Refills: 0 | Status: SHIPPED | OUTPATIENT
Start: 2024-08-08

## 2024-08-08 NOTE — TELEPHONE ENCOUNTER
Spoke with pt. Informed her of increase dose of Mounjaro. She v/u.     Pt wanted to discuss further with Dr. Montalvo at her appt on 8/17/24 possibly discontinuing Metformin soon. Pt does need a refill of Metformin (prior to her appt w/SV) and doesn't want a 90 day refill, incase Dr. Montalvo would stop it sooner than that.     TJ: OK to refill Metformin 500 MG for 30 days?  Pended refill, sign if OK, thanks!       Future Appointments   Date Time Provider Department Center   8/17/2024 11:00 AM Inna Montalvo, DO [facility] [place]

## 2024-08-08 NOTE — TELEPHONE ENCOUNTER
- Stopping Glipizide. That is good as Glipizide is obesogenic.  - should continue metformin  - I prescribed mounjaro 5 mg

## 2024-08-08 NOTE — TELEPHONE ENCOUNTER
Ok with it.  Metformin could also help weight loss. She could discuss with Dr. Cota when she comes back.

## 2024-08-14 DIAGNOSIS — E11.65 TYPE 2 DIABETES MELLITUS WITH HYPERGLYCEMIA, WITHOUT LONG-TERM CURRENT USE OF INSULIN (HCC): ICD-10-CM

## 2024-08-17 ENCOUNTER — OFFICE VISIT (OUTPATIENT)
Dept: INTERNAL MEDICINE CLINIC | Facility: CLINIC | Age: 64
End: 2024-08-17
Payer: COMMERCIAL

## 2024-08-17 VITALS
RESPIRATION RATE: 16 BRPM | TEMPERATURE: 98 F | SYSTOLIC BLOOD PRESSURE: 134 MMHG | OXYGEN SATURATION: 99 % | DIASTOLIC BLOOD PRESSURE: 76 MMHG | HEIGHT: 62 IN | HEART RATE: 73 BPM | BODY MASS INDEX: 34.38 KG/M2 | WEIGHT: 186.81 LBS

## 2024-08-17 DIAGNOSIS — E11.65 TYPE 2 DIABETES MELLITUS WITH HYPERGLYCEMIA, WITHOUT LONG-TERM CURRENT USE OF INSULIN (HCC): Primary | ICD-10-CM

## 2024-08-17 PROCEDURE — 3008F BODY MASS INDEX DOCD: CPT | Performed by: INTERNAL MEDICINE

## 2024-08-17 PROCEDURE — 99213 OFFICE O/P EST LOW 20 MIN: CPT | Performed by: INTERNAL MEDICINE

## 2024-08-17 PROCEDURE — 3075F SYST BP GE 130 - 139MM HG: CPT | Performed by: INTERNAL MEDICINE

## 2024-08-17 PROCEDURE — 3078F DIAST BP <80 MM HG: CPT | Performed by: INTERNAL MEDICINE

## 2024-08-17 PROCEDURE — G2211 COMPLEX E/M VISIT ADD ON: HCPCS | Performed by: INTERNAL MEDICINE

## 2024-08-17 NOTE — PATIENT INSTRUCTIONS
Lets stop the metformin and continue with mounjaro at 5 mg    See me back in 3 months and lets do blood and urine test done

## 2024-08-17 NOTE — PROGRESS NOTES
Patient Office Visit    ASSESSMENT AND PLAN:   1. Type 2 diabetes mellitus with hyperglycemia, without long-term current use of insulin (HCC)  Note: will increase mounjaro to 5 mg and stop metformin. Repeat labs in 3 months. Continue close follow up with the ophthalmologist   - POC Hgb A1C  - Hemoglobin A1C [E]; Future  - Microalb/Creat Ratio, Random Urine [E]; Future    RTC in 3 months       Patient/Caregiver Education: Patient/Caregiver Education: There are no barriers to learning. Medical education done. Outcome: Patient verbalizes understanding. Patient is notified to call with any questions, complications, allergies, or worsening or changing symptoms.  Patient is to call with any side effects or complications from the treatments as a result of today.      Reviewed Past Medical History and   Patient Active Problem List   Diagnosis    Obesity (BMI 30-39.9)    Microalbuminuria due to type 2 diabetes mellitus (HCC)    Hypertension associated with diabetes (HCC)    Hyperlipidemia associated with type 2 diabetes mellitus (HCC)    Coronary artery disease involving native coronary artery of native heart without angina pectoris    Type 2 diabetes mellitus with hyperglycemia (HCC)    Mammogram declined    Cervical cancer screening declined    Colon cancer screening declined    Osteoporosis monitoring declined    Moderate nonproliferative diabetic retinopathy of both eyes without macular edema associated with type 2 diabetes mellitus (HCC)       Orders Placed This Encounter   Procedures    POC Hgb A1C     Order Specific Question:   Release to patient     Answer:   Immediate    Hemoglobin A1C [E]     Standing Status:   Future     Standing Expiration Date:   8/17/2025     Order Specific Question:   Release to patient     Answer:   Immediate    Microalb/Creat Ratio, Random Urine [E]     Standing Status:   Future     Standing Expiration Date:   8/17/2025     Order Specific Question:   Release to patient     Answer:    Immediate     Requested Prescriptions      No prescriptions requested or ordered in this encounter         Inna Montalvo DO  CC:  Chief Complaint   Patient presents with    Diabetes         HPI:   Priyanka Bowman is a 63 year old female who presents for a follow up    Diabetes: has been tolerating mounjaro. She is making sure that she follows up with her eye doctor very closely. She has noticed that her sugars in the mornings can be up to 150s even without eating, but the rest of the day, she does well. She is interested in stopping metformin     Past Medical History:    Diabetes (HCC)    Essential hypertension    Hyperlipidemia       Past Surgical History:   Procedure Laterality Date    Angioplasty (coronary)  2018    stent x3          x1       Social History:  Social History     Socioeconomic History    Marital status:    Tobacco Use    Smoking status: Never    Smokeless tobacco: Never   Vaping Use    Vaping status: Never Used   Substance and Sexual Activity    Alcohol use: No    Drug use: No   Other Topics Concern    Caffeine Concern No    Exercise No     Social Determinants of Health     Physical Activity: Insufficiently Active (2020)    [facility] [employer]    Exercise Vital Sign     Days of Exercise per Week: 2 days     Minutes of Exercise per Session: 30 min     Family History:  Family History   Problem Relation Age of Onset    Diabetes Father      Allergies:  Allergies   Allergen Reactions    Atorvastatin MYALGIA    Jardiance [Empagliflozin] ITCHING     Current Meds:  Current Outpatient Medications on File Prior to Visit   Medication Sig Dispense Refill    CONTOUR NEXT TEST In Vitro Strip USE 1 STRIP TO CHECK GLUCOSE THREE TIMES DAILY 100 each 0    Tirzepatide (MOUNJARO) 5 MG/0.5ML Subcutaneous Solution Pen-injector Inject 5 mg into the skin once a week for 4 doses. 2 mL 0    LISINOPRIL 40 MG Oral Tab Take 1 tablet by mouth once daily 90  tablet 0    amLODIPine 5 MG Oral Tab Take 1 tablet (5 mg total) by mouth daily. 90 tablet 0    APPLE CIDER VINEGAR OR Take 1 tablet by mouth daily.      rosuvastatin 10 MG Oral Tab Take 1 tablet (10 mg total) by mouth daily.      Metoprolol Succinate ER 25 MG Oral Tablet 24 Hr Take 1 tablet (25 mg total) by mouth daily.      ONETOUCH DELICA LANCETS 33G Does not apply Misc Use one lancet for each blood glucose test. Test 3 x week in the AM before breakfast, and 2 x a week 2 hours after large meal. 100 each 3    aspirin 81 MG Oral Tab EC Take 1 tablet (81 mg total) by mouth daily. 30 tablet 11    [DISCONTINUED] CONTOUR NEXT TEST In Vitro Strip USE 1 STRIP TO CHECK GLUCOSE THREE TIMES DAILY 100 each 0     No current facility-administered medications on file prior to visit.         REVIEW OF SYSTEMS   Constitutional: no fatigue normal energy no weight changes   HENT: normal sinuses and no mouth issues   Eyes: . normal vision no eye pain   Respiratory: normal respirations no cough   Cardiovascular: no CP, or palpitations   Gastrointestinal: normal bowels and no abd pains   Genitourinary:  normal urination no hematuria, no frequency   Musculoskeletal: no pains in arms/legs, normal range of motion   Skin: no rashes or skin lesions that are new   Neurological:  no weakness, no numbness, normal gait   Hematological:  no bruises or bleeding   Psychiatric/Behavioral: normal mood no anxiety normal behavior     /76 (BP Location: Left arm, Patient Position: Sitting, Cuff Size: adult)   Pulse 73   Temp 97.6 °F (36.4 °C) (Temporal)   Resp 16   Ht 5' 2\" (1.575 m)   Wt 186 lb 12.8 oz (84.7 kg)   SpO2 99%   BMI 34.17 kg/m²     PHYSICAL EXAM:   Constitutional: Vital signs reviewed as noted, well developed, in no acute distress.   HENT: NCAT  Eyes: pupils reactive bilaterally  Cardiovascular: nl s1 s2 no m/r/g  Pulmonary/Chest: CTA bilaterally with no wheezes  Extremities: no pedal edema   Neurological:  no weakness in UE  and LE, reflexes are normal  Skin: no rashes or bruises on visualized skin, not undressed   Psychiatric:normal mood

## 2024-08-20 LAB
CARTRIDGE EXPIRATION DATE: 2025 DATE
CARTRIDGE LOT#: 7155 NUMERIC
HEMOGLOBIN A1C: 7.1 % (ref 4.3–5.6)

## 2024-08-25 DIAGNOSIS — E11.59 HYPERTENSION ASSOCIATED WITH DIABETES (HCC): ICD-10-CM

## 2024-08-25 DIAGNOSIS — I15.2 HYPERTENSION ASSOCIATED WITH DIABETES (HCC): ICD-10-CM

## 2024-08-26 RX ORDER — AMLODIPINE BESYLATE 5 MG/1
5 TABLET ORAL DAILY
Qty: 90 TABLET | Refills: 0 | Status: SHIPPED | OUTPATIENT
Start: 2024-08-26

## 2024-09-09 DIAGNOSIS — E11.65 TYPE 2 DIABETES MELLITUS WITH HYPERGLYCEMIA, WITHOUT LONG-TERM CURRENT USE OF INSULIN (HCC): ICD-10-CM

## 2024-09-09 RX ORDER — TIRZEPATIDE 5 MG/.5ML
INJECTION, SOLUTION SUBCUTANEOUS
Qty: 6 ML | Refills: 1 | Status: SHIPPED | OUTPATIENT
Start: 2024-09-09

## 2024-09-10 RX ORDER — LISINOPRIL 40 MG/1
40 TABLET ORAL DAILY
Qty: 90 TABLET | Refills: 0 | Status: SHIPPED | OUTPATIENT
Start: 2024-09-10

## 2024-09-10 NOTE — TELEPHONE ENCOUNTER
Lisinopril 40 MG Oral Tablet          Will file in chart as: LISINOPRIL 40 MG Oral Tab    Sig: Take 1 tablet by mouth once daily    Disp: 90 tablet    Refills: 0    Start: 9/9/2024    Class: Normal    Non-formulary For: Type 2 diabetes mellitus with hyperglycemia, without long-term current use of insulin (HCC)    Last ordered: 2 months ago (6/14/2024) by Inna Montalvo DO    Last refill: 8/8/2024    Rx #: [id number]    Hypertension Medications Protocol Espbde4009/09/2024 05:53 AM   Protocol Details CMP or BMP in past 12 months    Last BP reading less than 140/90    In person appointment or virtual visit in the past 12 mos or appointment in next 3 mos    EGFRCR or GFRAA > 50      LOV 8/17/24  RTC 3 months   Filled 6/14/24 90 tabs 0 refills   Last labs 4/13/24  No future appointments.

## 2024-09-14 DIAGNOSIS — E11.65 TYPE 2 DIABETES MELLITUS WITH HYPERGLYCEMIA, WITHOUT LONG-TERM CURRENT USE OF INSULIN (HCC): ICD-10-CM

## 2024-10-08 ENCOUNTER — TELEPHONE (OUTPATIENT)
Dept: INTERNAL MEDICINE CLINIC | Facility: CLINIC | Age: 64
End: 2024-10-08

## 2024-10-08 NOTE — TELEPHONE ENCOUNTER
Pt called in requesting to be seen on a Saturday, states you usually squeeze her in. 10/19 schedule all booked. Are you able to squeeze pt in at the end of your day?    If not pt is requesting for a Saturday in November, no openings for Saturdays in November just yet.

## 2024-10-15 NOTE — TELEPHONE ENCOUNTER
I can add her at 11:15 on Saturday. If she comes early that is fine, but she may need to wait  Inna Montalvo DO

## 2024-11-09 ENCOUNTER — TELEPHONE (OUTPATIENT)
Dept: INTERNAL MEDICINE CLINIC | Facility: CLINIC | Age: 64
End: 2024-11-09

## 2024-11-09 NOTE — TELEPHONE ENCOUNTER
Please assist patient with her physical for my next Saturday appointment in December  Inna Montalvo DO

## 2024-11-11 NOTE — TELEPHONE ENCOUNTER
VM full could not leave message to call back to schedule a CPX or AWV (Check if pt will have medicare by than)

## 2024-11-29 DIAGNOSIS — I15.2 HYPERTENSION ASSOCIATED WITH DIABETES (HCC): ICD-10-CM

## 2024-11-29 DIAGNOSIS — E11.59 HYPERTENSION ASSOCIATED WITH DIABETES (HCC): ICD-10-CM

## 2024-11-29 RX ORDER — AMLODIPINE BESYLATE 5 MG/1
5 TABLET ORAL DAILY
Qty: 90 TABLET | Refills: 0 | Status: SHIPPED | OUTPATIENT
Start: 2024-11-29

## 2024-11-29 NOTE — TELEPHONE ENCOUNTER
Name from pharmacy: amLODIPine Besylate 5 MG Oral Tablet          Will file in chart as: AMLODIPINE 5 MG Oral Tab    Sig: Take 1 tablet by mouth once daily    Disp: 90 tablet    Refills: 0    Start: 11/29/2024    Class: Normal    Non-formulary For: Hypertension associated with diabetes (HCC)    Last ordered: 3 months ago (8/26/2024) by Inna Montalvo DO    Last refill: 10/27/2024    Rx #: [id number]    Hypertension Medications Protocol Lilpko1511/29/2024 05:51 AM   Protocol Details CMP or BMP in past 12 months    Last BP reading less than 140/90    In person appointment or virtual visit in the past 12 mos or appointment in next 3 mos    EGFRCR or GFRAA > 50      To be filled at: [facility] [address] [place] [address] DRIVE [phone], [phone]     LOV:08/17/2024  RTC:3 months   Labs:04/13/2024  Last filled:10/27/2024  Future Appointments   Date Time Provider Department Center   12/14/2024 11:00 AM Inna Montalvo DO [facility] [place]

## 2024-12-11 ENCOUNTER — TELEPHONE (OUTPATIENT)
Dept: INTERNAL MEDICINE CLINIC | Facility: CLINIC | Age: 64
End: 2024-12-11

## 2024-12-11 NOTE — TELEPHONE ENCOUNTER
She can come in sooner, but keep her slot at 11. Let her know there maybe a wait  Inna Montalvo DO

## 2024-12-11 NOTE — TELEPHONE ENCOUNTER
Comprehensive Intake Entered On:  2/12/2021 11:32 AM CST    Performed On:  2/12/2021 11:29 AM CST by Jean-Pierre Liu CMA               Summary   Chief Complaint :   Verbal consent given for a video visit.  Pt mother(Cait) wanting refills on his ADHD medications.   Menstrual Status :   N/A   Jean-Pierre Liu CMA - 2/12/2021 11:29 AM CST   Health Status   Allergies Verified? :   Yes   Medication History Verified? :   Yes   Immunizations Current :   No   Medical History Verified? :   Yes   Pre-Visit Planning Status :   Not completed   Tobacco Use? :   Never smoker   Jea-nPierre Liu CMA - 2/12/2021 11:29 AM CST   Meds / Allergies   (As Of: 2/12/2021 11:32:14 AM CST)   Allergies (Active)   No Known Medication Allergies  Estimated Onset Date:   Unspecified ; Created By:   Airam Patel CMA; Reaction Status:   Active ; Category:   Drug ; Substance:   No Known Medication Allergies ; Type:   Allergy ; Updated By:   Airam Patel CMA; Reviewed Date:   2/12/2021 11:31 AM CST        Medication List   (As Of: 2/12/2021 11:32:14 AM CST)   Prescription/Discharge Order    methylphenidate  :   methylphenidate ; Status:   Prescribed ; Ordered As Mnemonic:   methylphenidate 5 mg/5 mL oral solution ; Simple Display Line:   See Instructions, 15 mL po at noon  fill on or after 2/21/2020, 500 mL, 0 Refill(s) ; Ordering Provider:   Jey Feliciano PA-C; Catalog Code:   methylphenidate ; Order Dt/Tm:   2/21/2020 3:54:29 PM CST          dexmethylphenidate  :   dexmethylphenidate ; Status:   Prescribed ; Ordered As Mnemonic:   Focalin XR 30 mg oral capsule, extended release ; Simple Display Line:   30 mg, 1 cap(s), po, qam, for 30 day(s), fill on or after 2/21/2020, 30 cap(s), 0 Refill(s) ; Ordering Provider:   Jey Feliciano PA-C; Catalog Code:   dexmethylphenidate ; Order Dt/Tm:   2/21/2020 3:53:36 PM CST          dexmethylphenidate  :   dexmethylphenidate ; Status:   Prescribed ; Ordered As Mnemonic:   Focalin XR 30 mg oral capsule,  Pt states she needs to be seen sooner on Saturday and can't come in at 11. Informed her MD schedule overbooked and can't put pt in any sooner. Pt wonders if you will see her sooner anyway     Please advise     extended release ; Simple Display Line:   30 mg, 1 cap(s), po, qam, for 30 day(s), fill on or after 3/30/2020, 30 cap(s), 0 Refill(s) ; Ordering Provider:   Jey Feliciano PA-C; Catalog Code:   dexmethylphenidate ; Order Dt/Tm:   3/30/2020 10:10:40 AM CDT          methylphenidate  :   methylphenidate ; Status:   Prescribed ; Ordered As Mnemonic:   methylphenidate 5 mg/5 mL oral solution ; Simple Display Line:   See Instructions, 15 mL po at noon  fill on or after 3/19/2020, 500 mL, 0 Refill(s) ; Ordering Provider:   Jey Feliciano PA-C; Catalog Code:   methylphenidate ; Order Dt/Tm:   2/21/2020 3:56:31 PM CST            ID Risk Screen   Recent Travel History :   Last travel within 21 days   Family Member Travel History :   No recent travel   Other Exposure to Infectious Disease :   Unknown   COVID-19 Testing Status :   No positive COVID-19 test   Jean-Pierre Liu CMA - 2/12/2021 11:29 AM CST   Social History   Social History   (As Of: 2/12/2021 11:32:14 AM CST)   Tobacco:        Never (less than 100 in lifetime), Household tobacco concerns: No.   (Last Updated: 2/12/2021 11:30:09 AM CST by Jean-Pierre Liu CMA)          Electronic Cigarette/Vaping:        Electronic Cigarette Use: Never.   (Last Updated: 2/12/2021 11:31:27 AM CST by Jean-Pierre Liu CMA)          Home/Environment:        Lives with Father, Mother, Twin sister, younger brother.  Risks in environment: Does not wear helmet, Firearms in the home; unknown if locked..   (Last Updated: 7/25/2016 4:55:01 PM CDT by Rosalie Parra)

## 2024-12-14 ENCOUNTER — OFFICE VISIT (OUTPATIENT)
Dept: INTERNAL MEDICINE CLINIC | Facility: CLINIC | Age: 64
End: 2024-12-14
Payer: COMMERCIAL

## 2024-12-14 VITALS
TEMPERATURE: 97 F | OXYGEN SATURATION: 98 % | HEIGHT: 62 IN | WEIGHT: 182.19 LBS | SYSTOLIC BLOOD PRESSURE: 150 MMHG | DIASTOLIC BLOOD PRESSURE: 70 MMHG | RESPIRATION RATE: 16 BRPM | HEART RATE: 69 BPM | BODY MASS INDEX: 33.53 KG/M2

## 2024-12-14 DIAGNOSIS — E11.3393 MODERATE NONPROLIFERATIVE DIABETIC RETINOPATHY OF BOTH EYES WITHOUT MACULAR EDEMA ASSOCIATED WITH TYPE 2 DIABETES MELLITUS (HCC): ICD-10-CM

## 2024-12-14 DIAGNOSIS — I25.10 CORONARY ARTERY DISEASE INVOLVING NATIVE CORONARY ARTERY OF NATIVE HEART WITHOUT ANGINA PECTORIS: ICD-10-CM

## 2024-12-14 DIAGNOSIS — E11.69 HYPERLIPIDEMIA ASSOCIATED WITH TYPE 2 DIABETES MELLITUS (HCC): ICD-10-CM

## 2024-12-14 DIAGNOSIS — E11.65 TYPE 2 DIABETES MELLITUS WITH HYPERGLYCEMIA, WITHOUT LONG-TERM CURRENT USE OF INSULIN (HCC): ICD-10-CM

## 2024-12-14 DIAGNOSIS — Z00.00 ROUTINE PHYSICAL EXAMINATION: Primary | ICD-10-CM

## 2024-12-14 DIAGNOSIS — R80.9 MICROALBUMINURIA DUE TO TYPE 2 DIABETES MELLITUS (HCC): ICD-10-CM

## 2024-12-14 DIAGNOSIS — E66.9 OBESITY (BMI 30-39.9): ICD-10-CM

## 2024-12-14 DIAGNOSIS — E11.29 MICROALBUMINURIA DUE TO TYPE 2 DIABETES MELLITUS (HCC): ICD-10-CM

## 2024-12-14 DIAGNOSIS — I10 BENIGN ESSENTIAL HTN: ICD-10-CM

## 2024-12-14 DIAGNOSIS — E78.5 HYPERLIPIDEMIA ASSOCIATED WITH TYPE 2 DIABETES MELLITUS (HCC): ICD-10-CM

## 2024-12-14 PROBLEM — E11.59 HYPERTENSION ASSOCIATED WITH DIABETES (HCC): Status: RESOLVED | Noted: 2022-01-03 | Resolved: 2024-12-14

## 2024-12-14 PROBLEM — I15.2 HYPERTENSION ASSOCIATED WITH DIABETES (HCC): Status: RESOLVED | Noted: 2022-01-03 | Resolved: 2024-12-14

## 2024-12-14 LAB
CARTRIDGE EXPIRATION DATE: 2025 DATE
CARTRIDGE LOT#: 8968 NUMERIC
CREAT UR-SCNC: 333.6 MG/DL
HEMOGLOBIN A1C: 6.5 % (ref 4.3–5.6)
MICROALBUMIN UR-MCNC: 54.4 MG/DL
MICROALBUMIN/CREAT 24H UR-RTO: 163.1 UG/MG (ref ?–30)

## 2024-12-14 PROCEDURE — 82043 UR ALBUMIN QUANTITATIVE: CPT | Performed by: INTERNAL MEDICINE

## 2024-12-14 PROCEDURE — 82570 ASSAY OF URINE CREATININE: CPT | Performed by: INTERNAL MEDICINE

## 2024-12-14 RX ORDER — OLMESARTAN MEDOXOMIL 20 MG/1
20 TABLET ORAL NIGHTLY
Qty: 90 TABLET | Refills: 0 | Status: SHIPPED | OUTPATIENT
Start: 2024-12-14

## 2024-12-14 NOTE — PROGRESS NOTES
Patient Office Visit    ASSESSMENT AND PLAN:   1. Routine physical examination  Note: I have highly recommended the shingles and the pneumonia vaccine.  Patient continues to decline the vaccines as well as cancer screenings.  Blood tests have been ordered.  - Basic Metabolic Panel (8) [E]; Future  - Lipid Panel; Future  - CBC W Differential W Platelet [E]; Future  - TSH W Reflex To Free T4 [E]; Future  - AST (SGOT); Future  - ALT(SGPT); Future    2. Type 2 diabetes mellitus with hyperglycemia, without long-term current use of insulin ([facility])  Note: Diabetes is very well-controlled now.  Will continue with the Mounjaro and metformin.  - POC Hemoglobin A1C    3. Moderate nonproliferative diabetic retinopathy of both eyes without macular edema associated with type 2 diabetes mellitus ([facility])  Note: Follows with her eye specialist regularly and will be getting ProKera injections    4. Microalbuminuria due to type 2 diabetes mellitus ([facility])  Note: Will switch lisinopril to losartan for better blood pressure control and it might help with the urine levels.  Continue Mounjaro  - Microalb/Creat Ratio, Random Urine; Future  - Microalb/Creat Ratio, Random Urine    5. Hyperlipidemia associated with type 2 diabetes mellitus ([facility])  Note: Continue statin  - Lipid Panel; Future  - AST (SGOT); Future  - ALT(SGPT); Future    6. Coronary artery disease involving native coronary artery of native heart without angina pectoris  Note: Continue statin and beta-blocker.  Patient will be having a stress test sometime next year and she follows with her cardiologist    7. Benign essential HTN  Note: Will stop lisinopril and start olmesartan for better blood pressure control.  Continue amlodipine and metoprolol  - Basic Metabolic Panel (8) [E]; Future  - TSH W Reflex To Free T4 [E]; Future  - olmesartan 20 MG Oral Tab; Take 1 tablet (20 mg total) by mouth at bedtime.  Dispense: 90 tablet; Refill: 0    8. Obesity (BMI 30-39.9)  Note: Applauded on  the weight loss.    Return to clinic in 6 months for follow-up and patient will let me know her blood pressure readings in 2 weeks as she sees her eye doctor every few days      Patient/Caregiver Education: Patient/Caregiver Education: There are no barriers to learning. Medical education done. Outcome: Patient verbalizes understanding. Patient is notified to call with any questions, complications, allergies, or worsening or changing symptoms.  Patient is to call with any side effects or complications from the treatments as a result of today.      Reviewed Past Medical History and   Patient Active Problem List   Diagnosis    Obesity (BMI 30-39.9)    Microalbuminuria due to type 2 diabetes mellitus (HCC)    Hyperlipidemia associated with type 2 diabetes mellitus (HCC)    Coronary artery disease involving native coronary artery of native heart without angina pectoris    Type 2 diabetes mellitus with hyperglycemia (HCC)    Mammogram declined    Cervical cancer screening declined    Colon cancer screening declined    Osteoporosis monitoring declined    Moderate nonproliferative diabetic retinopathy of both eyes without macular edema associated with type 2 diabetes mellitus (HCC)    Benign essential HTN       Orders Placed This Encounter   Procedures    Microalb/Creat Ratio, Random Urine     Standing Status:   Future     Number of Occurrences:   1     Standing Expiration Date:   12/14/2025    Basic Metabolic Panel (8) [E]     Standing Status:   Future     Standing Expiration Date:   12/14/2025     Order Specific Question:   Release to patient     Answer:   Immediate    Lipid Panel     Standing Status:   Future     Standing Expiration Date:   12/14/2025    CBC W Differential W Platelet [E]     Standing Status:   Future     Standing Expiration Date:   12/14/2025     Order Specific Question:   Release to patient     Answer:   Immediate    TSH W Reflex To Free T4 [E]     Standing Status:   Future     Standing Expiration Date:    2025     Order Specific Question:   Release to patient     Answer:   Immediate    AST (SGOT)     Standing Status:   Future     Standing Expiration Date:   2025    ALT(SGPT)     Standing Status:   Future     Standing Expiration Date:   2025    POC Hemoglobin A1C     Order Specific Question:   Release to patient     Answer:   Immediate     Requested Prescriptions     Signed Prescriptions Disp Refills    olmesartan 20 MG Oral Tab 90 tablet 0     Sig: Take 1 tablet (20 mg total) by mouth at bedtime.         Inna Montalvo,   CC:  Chief Complaint   Patient presents with    Physical         HPI:   Priyanka Bowman Is a 64-year-old female who presents for routine physical    Diabetes: She does not really check her sugars that much but she feels that her urine is looking much better and she is losing weight as well.  In regards to the retinopathy: She will be getting a new treatment done as her eyes are still blurry.  Hypertension/hyperlipidemia: Cholesterol panel has been stable but her blood pressure has been high when she goes to the eye doctor  Coronary artery disease: Stable and has had a stent placed in the past    Past Medical History:    Diabetes (HCC)    Essential hypertension    Hyperlipidemia       Past Surgical History:   Procedure Laterality Date    Angioplasty (coronary)  2018    stent x3          x1       Social History:  Social History     Socioeconomic History    Marital status:    Tobacco Use    Smoking status: Never    Smokeless tobacco: Never   Vaping Use    Vaping status: Never Used   Substance and Sexual Activity    Alcohol use: No    Drug use: No   Other Topics Concern    Caffeine Concern No    Exercise No     Social Drivers of Health     Physical Activity: Insufficiently Active (2020)    [facility] [employer] [employer]    Exercise Vital Sign     Days of Exercise per Week: 2 days     Minutes of Exercise per Session: 30 min      Family History:  Family History   Problem Relation Age of Onset    Diabetes Father      Allergies:  Allergies[1]  Current Meds:  Medications Ordered Prior to Encounter[2]      REVIEW OF SYSTEMS   Constitutional: no fatigue normal energy no weight changes   HENT: normal sinuses and no mouth issues   Eyes: . normal vision no eye pain   Respiratory: normal respirations no cough   Cardiovascular: no CP, or palpitations   Gastrointestinal: normal bowels and no abd pains   Genitourinary:  normal urination no hematuria, no frequency   Musculoskeletal: no pains in arms/legs, normal range of motion   Skin: no rashes or skin lesions that are new   Neurological:  no weakness, no numbness, normal gait   Hematological:  no bruises or bleeding   Psychiatric/Behavioral: normal mood no anxiety normal behavior     /70   Pulse 69   Temp 97.3 °F (36.3 °C) (Temporal)   Resp 16   Ht 5' 2\" (1.575 m)   Wt 182 lb 3.2 oz (82.6 kg)   SpO2 98%   BMI 33.32 kg/m²     PHYSICAL EXAM:   Constitutional: Vital signs reviewed as noted, well developed, in no acute distress.   HENT: NCAT, bilateral ear canal and tympanic membrane appear normal  Eyes: pupils reactive bilaterally  Neck: No thyroidmegaly  Cardiovascular: nl s1 s2 no m/r/g  Pulmonary/Chest: CTA bilaterally with no wheezes  Abdominal: Soft NT normal Bowel sounds  Extremities: no pedal edema   Neurological:  no weakness in UE and LE, reflexes are normal  Skin: no rashes or bruises on visualized skin, not undressed   Psychiatric:normal mood              [1]   Allergies  Allergen Reactions    Atorvastatin MYALGIA    Jardiance [Empagliflozin] ITCHING   [2]   Current Outpatient Medications on File Prior to Visit   Medication Sig Dispense Refill    AMLODIPINE 5 MG Oral Tab Take 1 tablet by mouth once daily 90 tablet 0    CONTOUR NEXT TEST In Vitro Strip USE 1 STRIP TO CHECK GLUCOSE THREE TIMES DAILY 100 each 0    Tirzepatide (MOUNJARO) 5 MG/0.5ML Subcutaneous Solution  Pen-injector INJECT 5 MG ONCE A WEEK FOR  4  DOSES 6 mL 1    APPLE CIDER VINEGAR OR Take 1 tablet by mouth daily.      rosuvastatin 10 MG Oral Tab Take 1 tablet (10 mg total) by mouth daily.      Metoprolol Succinate ER 25 MG Oral Tablet 24 Hr Take 1 tablet (25 mg total) by mouth daily.      ONETOUCH DELICA LANCETS 33G Does not apply Misc Use one lancet for each blood glucose test. Test 3 x week in the AM before breakfast, and 2 x a week 2 hours after large meal. 100 each 3    aspirin 81 MG Oral Tab EC Take 1 tablet (81 mg total) by mouth daily. 30 tablet 11     No current facility-administered medications on file prior to visit.

## 2024-12-14 NOTE — PATIENT INSTRUCTIONS
Please stop lisinopril and start olmesartan at night time. Continue the rest of your medications    Great job on your diabetes!!!!    I highly recommend the shingles and the pneumonia vaccine    I highly recommend mammogram and colonoscopies    Let me know how your blood pressure is at the eye doctor's office    See me back every 3 months

## 2024-12-16 NOTE — PROGRESS NOTES
Dear RN, please let the patient know   The urine test shows that the protein is improving. It will continue to improve with continued control of diabetes  Inna Montalvo DO

## 2024-12-19 ENCOUNTER — TELEPHONE (OUTPATIENT)
Dept: INTERNAL MEDICINE CLINIC | Facility: CLINIC | Age: 64
End: 2024-12-19

## 2024-12-19 DIAGNOSIS — R30.0 DYSURIA: Primary | ICD-10-CM

## 2024-12-19 NOTE — TELEPHONE ENCOUNTER
Pt stating she is experiencing side effects that she believe is from starting Olmesartan.   Pt states this also happened when she started Jardiance.     Pt c/o vaginal itching, dark yellow urine, burning with urination.   Please advise

## 2024-12-19 NOTE — TELEPHONE ENCOUNTER
lets get it kidney blood test which I had previously ordered.  No need to fast and lets get a urine test as well.  Inna Montalvo DO

## 2024-12-19 NOTE — TELEPHONE ENCOUNTER
Dr. Montalvo- Patient is experiencing vaginal itching, dark yellow urine, and burning with urination. Would you like to order a urine culture? Please advise. TY!

## 2024-12-19 NOTE — TELEPHONE ENCOUNTER
Action Requested: Summary for Provider     []  Critical Lab, Recommendations Needed  [x] Need Additional Advice  []   FYI    []   Need Orders  [] Need Medications Sent to Pharmacy  []  Other     SUMMARY: Spoke to patient who reports experiencing vaginal itching, dark yellow urine, and burning with urination. Patient states she started taking Olmesartan on Sunday and on Tuesday 12/17 she began having symptoms. Patient concerned she had similar symptoms when she took Jardiance. Patient reports having good hygiene. Patient denies fever, hematuria, pelvic pain, nausea and vomiting. Patient is worried she could pass these symptoms to her  with intercourse. Patient is wanting PCP recommendations.    This RN provided PCP recommendations. Advised patient to get lab work  and urine culture done and there is no need to fast. Patient stated she works late tonight and wont make it until tomorrow. Advised patient if she is worried on passing these symptoms to her  she should with hold intercourse until receiving the results from the urine culture and to stay hydrated, drink cranberry juice, and warm sitz baths.     - Patient is asking if she should continue taking Olmesartan due to symptoms ? Please advise. TY!    Reason for call: Sick Call  Onset: 12/17/24

## 2024-12-20 ENCOUNTER — LAB ENCOUNTER (OUTPATIENT)
Dept: LAB | Age: 64
End: 2024-12-20
Attending: INTERNAL MEDICINE
Payer: COMMERCIAL

## 2024-12-20 DIAGNOSIS — R30.0 DYSURIA: ICD-10-CM

## 2024-12-20 DIAGNOSIS — E78.5 HYPERLIPIDEMIA ASSOCIATED WITH TYPE 2 DIABETES MELLITUS (HCC): ICD-10-CM

## 2024-12-20 DIAGNOSIS — I10 BENIGN ESSENTIAL HTN: ICD-10-CM

## 2024-12-20 DIAGNOSIS — E11.69 HYPERLIPIDEMIA ASSOCIATED WITH TYPE 2 DIABETES MELLITUS (HCC): ICD-10-CM

## 2024-12-20 DIAGNOSIS — Z00.00 ROUTINE PHYSICAL EXAMINATION: ICD-10-CM

## 2024-12-20 DIAGNOSIS — E11.65 TYPE 2 DIABETES MELLITUS WITH HYPERGLYCEMIA, WITHOUT LONG-TERM CURRENT USE OF INSULIN (HCC): ICD-10-CM

## 2024-12-20 LAB
ALT SERPL-CCNC: 23 U/L
ANION GAP SERPL CALC-SCNC: 7 MMOL/L (ref 0–18)
AST SERPL-CCNC: 26 U/L (ref ?–34)
BASOPHILS # BLD AUTO: 0.03 X10(3) UL (ref 0–0.2)
BASOPHILS NFR BLD AUTO: 0.4 %
BILIRUB UR QL STRIP.AUTO: NEGATIVE
BUN BLD-MCNC: 16 MG/DL (ref 9–23)
CALCIUM BLD-MCNC: 9.2 MG/DL (ref 8.7–10.4)
CHLORIDE SERPL-SCNC: 109 MMOL/L (ref 98–112)
CHOLEST SERPL-MCNC: 111 MG/DL (ref ?–200)
CLARITY UR REFRACT.AUTO: CLEAR
CO2 SERPL-SCNC: 24 MMOL/L (ref 21–32)
COLOR UR AUTO: YELLOW
CREAT BLD-MCNC: 1.01 MG/DL
CREAT UR-SCNC: 271.6 MG/DL
EGFRCR SERPLBLD CKD-EPI 2021: 61 ML/MIN/1.73M2 (ref 60–?)
EOSINOPHIL # BLD AUTO: 0.12 X10(3) UL (ref 0–0.7)
EOSINOPHIL NFR BLD AUTO: 1.6 %
ERYTHROCYTE [DISTWIDTH] IN BLOOD BY AUTOMATED COUNT: 13.2 %
EST. AVERAGE GLUCOSE BLD GHB EST-MCNC: 148 MG/DL (ref 68–126)
FASTING PATIENT LIPID ANSWER: YES
FASTING STATUS PATIENT QL REPORTED: YES
GLUCOSE BLD-MCNC: 98 MG/DL (ref 70–99)
GLUCOSE UR STRIP.AUTO-MCNC: NORMAL MG/DL
HBA1C MFR BLD: 6.8 % (ref ?–5.7)
HCT VFR BLD AUTO: 38.5 %
HDLC SERPL-MCNC: 37 MG/DL (ref 40–59)
HGB BLD-MCNC: 13.1 G/DL
IMM GRANULOCYTES # BLD AUTO: 0.01 X10(3) UL (ref 0–1)
IMM GRANULOCYTES NFR BLD: 0.1 %
KETONES UR STRIP.AUTO-MCNC: NEGATIVE MG/DL
LDLC SERPL CALC-MCNC: 59 MG/DL (ref ?–100)
LEUKOCYTE ESTERASE UR QL STRIP.AUTO: 75
LYMPHOCYTES # BLD AUTO: 2.81 X10(3) UL (ref 1–4)
LYMPHOCYTES NFR BLD AUTO: 38.5 %
MCH RBC QN AUTO: 29 PG (ref 26–34)
MCHC RBC AUTO-ENTMCNC: 34 G/DL (ref 31–37)
MCV RBC AUTO: 85.2 FL
MICROALBUMIN UR-MCNC: 49.2 MG/DL
MICROALBUMIN/CREAT 24H UR-RTO: 181.1 UG/MG (ref ?–30)
MONOCYTES # BLD AUTO: 0.75 X10(3) UL (ref 0.1–1)
MONOCYTES NFR BLD AUTO: 10.3 %
NEUTROPHILS # BLD AUTO: 3.57 X10 (3) UL (ref 1.5–7.7)
NEUTROPHILS # BLD AUTO: 3.57 X10(3) UL (ref 1.5–7.7)
NEUTROPHILS NFR BLD AUTO: 49.1 %
NITRITE UR QL STRIP.AUTO: NEGATIVE
NONHDLC SERPL-MCNC: 74 MG/DL (ref ?–130)
OSMOLALITY SERPL CALC.SUM OF ELEC: 291 MOSM/KG (ref 275–295)
PH UR STRIP.AUTO: 5.5 [PH] (ref 5–8)
PLATELET # BLD AUTO: 259 10(3)UL (ref 150–450)
POTASSIUM SERPL-SCNC: 4 MMOL/L (ref 3.5–5.1)
PROT UR STRIP.AUTO-MCNC: 100 MG/DL
RBC # BLD AUTO: 4.52 X10(6)UL
RBC UR QL AUTO: NEGATIVE
SODIUM SERPL-SCNC: 140 MMOL/L (ref 136–145)
SP GR UR STRIP.AUTO: 1.03 (ref 1–1.03)
TRIGL SERPL-MCNC: 75 MG/DL (ref 30–149)
TSI SER-ACNC: 1.25 UIU/ML (ref 0.55–4.78)
UROBILINOGEN UR STRIP.AUTO-MCNC: NORMAL MG/DL
VLDLC SERPL CALC-MCNC: 11 MG/DL (ref 0–30)
WBC # BLD AUTO: 7.3 X10(3) UL (ref 4–11)

## 2024-12-20 PROCEDURE — 82043 UR ALBUMIN QUANTITATIVE: CPT

## 2024-12-20 PROCEDURE — 85025 COMPLETE CBC W/AUTO DIFF WBC: CPT

## 2024-12-20 PROCEDURE — 80061 LIPID PANEL: CPT

## 2024-12-20 PROCEDURE — 84460 ALANINE AMINO (ALT) (SGPT): CPT

## 2024-12-20 PROCEDURE — 83036 HEMOGLOBIN GLYCOSYLATED A1C: CPT

## 2024-12-20 PROCEDURE — 84443 ASSAY THYROID STIM HORMONE: CPT

## 2024-12-20 PROCEDURE — 87086 URINE CULTURE/COLONY COUNT: CPT

## 2024-12-20 PROCEDURE — 81001 URINALYSIS AUTO W/SCOPE: CPT

## 2024-12-20 PROCEDURE — 36415 COLL VENOUS BLD VENIPUNCTURE: CPT

## 2024-12-20 PROCEDURE — 80048 BASIC METABOLIC PNL TOTAL CA: CPT

## 2024-12-20 PROCEDURE — 84450 TRANSFERASE (AST) (SGOT): CPT

## 2024-12-20 PROCEDURE — 82570 ASSAY OF URINE CREATININE: CPT

## 2024-12-23 NOTE — PROGRESS NOTES
Dear RN, please let the patient know   1.  Cholesterol is in good range  2 diabetes is controlled  3.  Urine test continues to show protein that is improving  4.  Urine test was negative for an infection.  If she still having the symptoms?  5.  Liver function and kidney function are normal  6.  Thyroid function and blood count are normal  Please let me know if you have any questions  Inna Montalvo DO

## 2024-12-28 DIAGNOSIS — E11.65 TYPE 2 DIABETES MELLITUS WITH HYPERGLYCEMIA, WITHOUT LONG-TERM CURRENT USE OF INSULIN (HCC): ICD-10-CM

## 2024-12-30 NOTE — PROGRESS NOTES
Dear RN, please let the patient know unable to increase lisinopril dose as this is the highest dose. We can go up on the amlodipine to 10 mg, but it may increase swelling in the feet. Let me know what the readings are once she goes up on the amlodipine. If not then we need to consider other options  Inna Montalvo DO

## 2025-01-02 DIAGNOSIS — E11.65 TYPE 2 DIABETES MELLITUS WITH HYPERGLYCEMIA, WITHOUT LONG-TERM CURRENT USE OF INSULIN (HCC): ICD-10-CM

## 2025-01-03 RX ORDER — LISINOPRIL 40 MG/1
40 TABLET ORAL DAILY
Qty: 90 TABLET | Refills: 0 | OUTPATIENT
Start: 2025-01-03

## 2025-01-03 NOTE — TELEPHONE ENCOUNTER
Failed protocol     Last refill:    Disp Refills Start End    LISINOPRIL 40 MG Oral Tab (Discontinued) 90 tablet 0 9/10/2024 12/14/2024    Sig - Route: Take 1 tablet by mouth once daily - Oral    Sent to pharmacy as: Lisinopril 40 MG Oral Tablet (Prinivil; Zestril)    E-Prescribing Status: Receipt confirmed by pharmacy (9/10/2024  3:52 PM CDT)        LOV   12/14/2024 Dr Katia ROMERO 6 months  7. Benign essential HTN  Note: Will stop lisinopril and start olmesartan for better blood pressure control.  Continue amlodipine and metoprolol  - Basic Metabolic Panel (8) [E]; Future  - TSH W Reflex To Free T4 [E]; Future  - olmesartan 20 MG Oral Tab; Take 1 tablet (20 mg total) by mouth at bedtime.  Dispense: 90 tablet; Refill: 0  No FOV scheduled

## 2025-01-08 ENCOUNTER — TELEPHONE (OUTPATIENT)
Dept: INTERNAL MEDICINE CLINIC | Facility: CLINIC | Age: 65
End: 2025-01-08

## 2025-01-08 DIAGNOSIS — E11.59 HYPERTENSION ASSOCIATED WITH DIABETES (HCC): ICD-10-CM

## 2025-01-08 DIAGNOSIS — I15.2 HYPERTENSION ASSOCIATED WITH DIABETES (HCC): ICD-10-CM

## 2025-01-08 RX ORDER — LISINOPRIL 40 MG/1
40 TABLET ORAL DAILY
COMMUNITY
Start: 2025-01-08

## 2025-01-08 RX ORDER — AMLODIPINE BESYLATE 10 MG/1
10 TABLET ORAL DAILY
Qty: 90 TABLET | Refills: 0 | Status: SHIPPED | OUTPATIENT
Start: 2025-01-08

## 2025-01-08 NOTE — TELEPHONE ENCOUNTER
Spoke with patient to relay Dr. Montalvo's recommendations. Pt is OK with starting Amlodipine and she's OK with increasing to 10mg.     SV: Pt OK with starting Amlodipine 10mg as she remembers she was on it before and had no issues. Pended order, please review/edit and sign if agree. TY!

## 2025-01-08 NOTE — TELEPHONE ENCOUNTER
She should continue the lisinopril but in her last message she said that her blood pressure was still high on the lisinopril that is why it was recommended to increase the amlodipine.  If she is very hesitant about increasing the amlodipine then another prescription will need to be added if her blood pressure is still above 140/90.  Not everyone gets the swelling with the amlodipine.  It is important to keep blood pressure under control as it will help her eyes as well as kidneys.  If she would like to discuss more on the phone I can add her to my schedule  Inna Montalvo DO

## 2025-01-08 NOTE — TELEPHONE ENCOUNTER
Spoke with patient. Patient states she was taken off lisinopril and started on olmesartan. Then she stopped olmesartan and is using lisinopril again. Pt requesting if she should restart lisinopril or try something.     Pt is unsure how she feels about swelling in her feet. Pt won't  amlodpine until she is told what she needs to take.    SV: Please clarify what BP meds pt should take. Should she continue the lisinopril? This RN relayed urinalysis labs and recommendations to patient, but pt is hesitant on increasing amlodipine. Please advise, TY!    LOV 12/14/24:  7. Benign essential HTN  Note: Will stop lisinopril and start olmesartan for better blood pressure control.  Continue amlodipine and metoprolol  - Basic Metabolic Panel (8) [E]; Future  - TSH W Reflex To Free T4 [E]; Future  - olmesartan 20 MG Oral Tab; Take 1 tablet (20 mg total) by mouth at bedtime.  Dispense: 90 tablet; Refill: 0

## 2025-01-08 NOTE — TELEPHONE ENCOUNTER
Patient is requesting call to discuss if she is continuing medication? F/u to discuss furthermore.     LISINOPRIL 40 MG Oral Tab (Discontinued) 90 tablet

## 2025-01-22 RX ORDER — LISINOPRIL 40 MG/1
40 TABLET ORAL DAILY
Qty: 90 TABLET | Refills: 0 | Status: SHIPPED | OUTPATIENT
Start: 2025-01-22

## 2025-01-22 NOTE — TELEPHONE ENCOUNTER
Requesting    Name from pharmacy: Lisinopril 40 MG Oral Tablet         Will file in chart as: LISINOPRIL 40 MG Oral Tab    Sig: Take 1 tablet by mouth once daily    Disp: 90 tablet    Refills: 0    Start: 1/21/2025    Class: Normal    Non-formulary    Last ordered: 2 weeks ago (1/8/2025) by Inna Montalvo DO    Last refill: 12/3/2024    Rx #: [id number]    Hypertension Medications Protocol [id number]/21/2025 10:50 PM   Protocol Details Last BP reading less than 140/90    CMP or BMP in past 12 months    In person appointment or virtual visit in the past 12 mos or appointment in next 3 mos    EGFRCR or GFRAA > 50    Medication is active on med list        LOV: 12/14/2024  RTC: 3 months   Last Relevant Labs: 12/20/2024  Filled: 12/3/2024 #30 with 0 refills    No future appointments.

## 2025-02-07 NOTE — ED INITIAL ASSESSMENT (HPI)
Pt states she cut her finger today and went to [place]. Pt states BP was high and EKG was abnormal.  Pt denies pain. What Type Of Note Output Would You Prefer (Optional)?: Standard Output How Severe Is Your Skin Lesion?: mild Has Your Skin Lesion Been Treated?: not been treated Is This A New Presentation, Or A Follow-Up?: Growth Additional History: Pt believes he has a wart on the heel of his foot. Has a history of childhood plantar warts.

## 2025-02-19 RX ORDER — TIRZEPATIDE 5 MG/.5ML
5 INJECTION, SOLUTION SUBCUTANEOUS WEEKLY
Qty: 12 ML | Refills: 0 | Status: SHIPPED | OUTPATIENT
Start: 2025-02-19

## 2025-02-19 NOTE — TELEPHONE ENCOUNTER
Protocol FAIL    Requesting: Tirzepatide (MOUNJARO) 5 MG/0.5ML Subcutaneous Solution Pen-injector     LOV: 12/14/24  RTC: 6 MONTHS  Filled: 9/9/24 6ML 1 REFILL  Recent Labs: 12/20/24    Upcoming OV   No future appointments.

## 2025-03-17 ENCOUNTER — TELEPHONE (OUTPATIENT)
Dept: INTERNAL MEDICINE CLINIC | Facility: CLINIC | Age: 65
End: 2025-03-17

## 2025-03-17 DIAGNOSIS — E11.65 TYPE 2 DIABETES MELLITUS WITH HYPERGLYCEMIA, WITHOUT LONG-TERM CURRENT USE OF INSULIN (HCC): ICD-10-CM

## 2025-03-17 DIAGNOSIS — I10 BENIGN ESSENTIAL HTN: Primary | ICD-10-CM

## 2025-03-17 DIAGNOSIS — E11.29 MICROALBUMINURIA DUE TO TYPE 2 DIABETES MELLITUS (HCC): ICD-10-CM

## 2025-03-17 DIAGNOSIS — E78.5 HYPERLIPIDEMIA ASSOCIATED WITH TYPE 2 DIABETES MELLITUS (HCC): ICD-10-CM

## 2025-03-17 DIAGNOSIS — R80.9 MICROALBUMINURIA DUE TO TYPE 2 DIABETES MELLITUS (HCC): ICD-10-CM

## 2025-03-17 DIAGNOSIS — I25.10 CORONARY ARTERY DISEASE INVOLVING NATIVE CORONARY ARTERY OF NATIVE HEART WITHOUT ANGINA PECTORIS: ICD-10-CM

## 2025-03-17 DIAGNOSIS — E11.69 HYPERLIPIDEMIA ASSOCIATED WITH TYPE 2 DIABETES MELLITUS (HCC): ICD-10-CM

## 2025-04-22 ENCOUNTER — TELEPHONE (OUTPATIENT)
Dept: INTERNAL MEDICINE CLINIC | Facility: CLINIC | Age: 65
End: 2025-04-22

## 2025-04-22 RX ORDER — LISINOPRIL 40 MG/1
40 TABLET ORAL DAILY
Qty: 90 TABLET | Refills: 0 | Status: SHIPPED | OUTPATIENT
Start: 2025-04-22 | End: 2025-07-24

## 2025-04-22 NOTE — TELEPHONE ENCOUNTER
Pt is asking if you would be able to squeeze her in for a follow up on this Saturday or next Saturday you work. She was hoping for this Saturday for a follow up. I let her know you are booked but she was just asking if we could still see.

## 2025-04-22 NOTE — TELEPHONE ENCOUNTER
Patient schedule for video visit (ok per SV)     Patient's eye doctor checks BP and patient will have labs completed this weekend.     Future Appointments   Date Time Provider Department Center   5/12/2025  2:30 PM Inna Montalvo DO [facility]   6/21/2025  7:00 AM REF [facility] REF [facility] Ref [facility]

## 2025-04-22 NOTE — TELEPHONE ENCOUNTER
Protocol FAIL    Requesting: LISINOPRIL 40 MG Oral Tab     LOV: 12/14/24  RTC: 6 MONTHS  Filled: 1/22/25 90 TABLET 0 REFILL  Recent Labs: 12/20/24    Upcoming OV   Future Appointments   Date Time Provider Department Center   6/21/2025  7:00 AM REF [facility] REF [facility] Ref [facility]

## 2025-04-24 NOTE — TELEPHONE ENCOUNTER
Future Appointments   Date Time Provider Department Center   5/12/2025  2:30 PM Inna Montalvo, DO [facility] [place]   6/21/2025  7:00 AM REF [facility] REF [facility] Ref [facility] 8     Appt scheduled

## 2025-04-26 ENCOUNTER — LAB ENCOUNTER (OUTPATIENT)
Dept: LAB | Age: 65
End: 2025-04-26
Attending: INTERNAL MEDICINE
Payer: COMMERCIAL

## 2025-04-26 DIAGNOSIS — E11.65 TYPE 2 DIABETES MELLITUS WITH HYPERGLYCEMIA, WITHOUT LONG-TERM CURRENT USE OF INSULIN (HCC): ICD-10-CM

## 2025-04-26 DIAGNOSIS — E78.5 HYPERLIPIDEMIA ASSOCIATED WITH TYPE 2 DIABETES MELLITUS (HCC): ICD-10-CM

## 2025-04-26 DIAGNOSIS — I25.10 CORONARY ARTERY DISEASE INVOLVING NATIVE CORONARY ARTERY OF NATIVE HEART WITHOUT ANGINA PECTORIS: ICD-10-CM

## 2025-04-26 DIAGNOSIS — E11.69 HYPERLIPIDEMIA ASSOCIATED WITH TYPE 2 DIABETES MELLITUS (HCC): ICD-10-CM

## 2025-04-26 DIAGNOSIS — I10 BENIGN ESSENTIAL HTN: ICD-10-CM

## 2025-04-26 LAB
ALT SERPL-CCNC: 19 U/L (ref 10–49)
ANION GAP SERPL CALC-SCNC: 9 MMOL/L (ref 0–18)
AST SERPL-CCNC: 23 U/L (ref ?–34)
BUN BLD-MCNC: 19 MG/DL (ref 9–23)
CALCIUM BLD-MCNC: 9.6 MG/DL (ref 8.7–10.6)
CHLORIDE SERPL-SCNC: 109 MMOL/L (ref 98–112)
CHOLEST SERPL-MCNC: 135 MG/DL (ref ?–200)
CO2 SERPL-SCNC: 24 MMOL/L (ref 21–32)
CREAT BLD-MCNC: 1.2 MG/DL (ref 0.55–1.02)
EGFRCR SERPLBLD CKD-EPI 2021: 50 ML/MIN/1.73M2 (ref 60–?)
EST. AVERAGE GLUCOSE BLD GHB EST-MCNC: 148 MG/DL (ref 68–126)
FASTING PATIENT LIPID ANSWER: YES
FASTING STATUS PATIENT QL REPORTED: YES
GLUCOSE BLD-MCNC: 113 MG/DL (ref 70–99)
HBA1C MFR BLD: 6.8 % (ref ?–5.7)
HDLC SERPL-MCNC: 43 MG/DL (ref 40–59)
LDLC SERPL CALC-MCNC: 75 MG/DL (ref ?–100)
NONHDLC SERPL-MCNC: 92 MG/DL (ref ?–130)
OSMOLALITY SERPL CALC.SUM OF ELEC: 297 MOSM/KG (ref 275–295)
POTASSIUM SERPL-SCNC: 3.9 MMOL/L (ref 3.5–5.1)
SODIUM SERPL-SCNC: 142 MMOL/L (ref 136–145)
TRIGL SERPL-MCNC: 88 MG/DL (ref 30–149)
TSI SER-ACNC: 1.38 UIU/ML (ref 0.55–4.78)
VLDLC SERPL CALC-MCNC: 14 MG/DL (ref 0–30)

## 2025-04-26 PROCEDURE — 80061 LIPID PANEL: CPT

## 2025-04-26 PROCEDURE — 84460 ALANINE AMINO (ALT) (SGPT): CPT

## 2025-04-26 PROCEDURE — 83036 HEMOGLOBIN GLYCOSYLATED A1C: CPT

## 2025-04-26 PROCEDURE — 80048 BASIC METABOLIC PNL TOTAL CA: CPT

## 2025-04-26 PROCEDURE — 84450 TRANSFERASE (AST) (SGOT): CPT

## 2025-04-26 PROCEDURE — 84443 ASSAY THYROID STIM HORMONE: CPT

## 2025-04-26 PROCEDURE — 36415 COLL VENOUS BLD VENIPUNCTURE: CPT

## 2025-04-28 NOTE — PROGRESS NOTES
Dear RN, please let the patient know   Diabetes is well-controlled  Kidney function has slightly declined from before.  Please keep well-hydrated and avoid NSAIDs such as ibuprofen Aleve/and Advil.  Electrolytes such as sodium and potassium are normal  Thyroid function is normal  Liver function and cholesterol levels are normal  Please remind patient to have the urine test done as well  Inna Montalvo DO     normal gait and station

## 2025-05-01 DIAGNOSIS — I15.2 HYPERTENSION ASSOCIATED WITH DIABETES (HCC): ICD-10-CM

## 2025-05-01 DIAGNOSIS — E11.59 HYPERTENSION ASSOCIATED WITH DIABETES (HCC): ICD-10-CM

## 2025-05-01 RX ORDER — AMLODIPINE BESYLATE 10 MG/1
10 TABLET ORAL DAILY
Qty: 90 TABLET | Refills: 0 | Status: SHIPPED | OUTPATIENT
Start: 2025-05-01

## 2025-05-01 NOTE — TELEPHONE ENCOUNTER
Protocol FAIL    Requesting: amLODIPine 10 MG Oral Tab     LOV: 12/14/24  RTC:   Filled: 1/8/25 90 TABLET 0 REFILL  Recent Labs: 4/26/25    Upcoming OV   Future Appointments   Date Time Provider Department Center   5/12/2025  2:30 PM Inna Montalvo,  [facility] 8 [facility] [place]   6/21/2025  7:00 AM REF [facility] REF [facility] Ref [facility] 8

## 2025-05-12 ENCOUNTER — TELEMEDICINE (OUTPATIENT)
Dept: INTERNAL MEDICINE CLINIC | Facility: CLINIC | Age: 65
End: 2025-05-12
Payer: COMMERCIAL

## 2025-05-12 DIAGNOSIS — E11.29 TYPE 2 DIABETES MELLITUS WITH DIABETIC MICROALBUMINURIA, WITHOUT LONG-TERM CURRENT USE OF INSULIN (HCC): Primary | ICD-10-CM

## 2025-05-12 DIAGNOSIS — I10 BENIGN ESSENTIAL HTN: ICD-10-CM

## 2025-05-12 DIAGNOSIS — E78.5 HYPERLIPIDEMIA ASSOCIATED WITH TYPE 2 DIABETES MELLITUS (HCC): ICD-10-CM

## 2025-05-12 DIAGNOSIS — E11.3393 MODERATE NONPROLIFERATIVE DIABETIC RETINOPATHY OF BOTH EYES WITHOUT MACULAR EDEMA ASSOCIATED WITH TYPE 2 DIABETES MELLITUS (HCC): ICD-10-CM

## 2025-05-12 DIAGNOSIS — N17.9 AKI (ACUTE KIDNEY INJURY): ICD-10-CM

## 2025-05-12 DIAGNOSIS — E11.69 HYPERLIPIDEMIA ASSOCIATED WITH TYPE 2 DIABETES MELLITUS (HCC): ICD-10-CM

## 2025-05-12 DIAGNOSIS — E11.43 DIABETIC AUTONOMIC NEUROPATHY ASSOCIATED WITH TYPE 2 DIABETES MELLITUS (HCC): ICD-10-CM

## 2025-05-12 DIAGNOSIS — R80.9 TYPE 2 DIABETES MELLITUS WITH DIABETIC MICROALBUMINURIA, WITHOUT LONG-TERM CURRENT USE OF INSULIN (HCC): Primary | ICD-10-CM

## 2025-05-12 PROBLEM — E11.65 TYPE 2 DIABETES MELLITUS WITH HYPERGLYCEMIA (HCC): Status: RESOLVED | Noted: 2022-05-02 | Resolved: 2025-05-12

## 2025-05-12 NOTE — PROGRESS NOTES
Virtual Telephone Check-In    This visit is conducted using Telemedicine with live, interactive video and audio. Patient resides in [place]   Patient understands and accepts financial responsibility for any deductible, co-insurance and/or co-pays associated with this service.    Telehealth outside of [facility]  Telehealth Verbal Consent   I conducted a telehealth visit with JEWELL on 5/12/2025   which was completed using two-way, real-time interactive audio and video  communication. This has been done in good chelita to provide continuity of care in the best interest of the provider-patient relationship, due to the COVID -19 public health crisis/national emergency where restrictions of face-to-face office visits are ongoing. Every conscious effort was taken to allow for sufficient and adequate time to complete the visit.  The patient was made aware of the limitations of the telehealth visit, including treatment limitations as no physical exam could be performed.  The patient was advised to call 911 or to go to the ER in case there was an emergency.  The patient was also advised of the potential privacy & security concerns related to the telehealth platform.   The patient was made aware of where to find [facility]'s notice of privacy practices, telehealth consent form and other related consent forms and documents.  which are located on the [facility] website. The patient verbally agreed to telehealth consent form, related consents and the risks discussed.    Lastly, the patient confirmed that they were in [place].   Included in this visit, time may have been spent reviewing labs, medications, radiology tests and decision making. Appropriate medical decision-making and tests are ordered as detailed in the plan of care above.  Coding/billing information is submitted for this visit based on complexity of care and/or time spent for the visit.  Time spent: 30 minutes   HPI: Priyanka Bowman is a 64-year-old female who is calling to  discuss her test results.  She has been checking her blood pressure every week through the ophthalmologist and it has been between 122-144 over 70s.  She is happy with her sugar levels and she wants to continue the Mounjaro.  She has not checked her weight to see if her weight has gone down.  She is also noticing some neuropathic symptoms with numbness and tingling in the legs but she is not interested in any medications.  She will be seeing a chiropractor.  ROS:  General: Feels well overall  Skin: Denies any unusual skin lesions  Eyes: Denies blurred vision or double vision  All systems negative, except for above  Physical:  GENERAL: Alert and oriented, well developed, well nourished,in no apparent distress  SKIN: no rashes,no suspicious lesions on face  LUNGS: no audible wheezing  PSYCH: pleasant, appropriate mood and affect    Assessment and Plan  1. Type 2 diabetes mellitus with diabetic microalbuminuria, without long-term current use of insulin ([facility])  Note: Will continue with Mounjaro as A1c is well-controlled.  She does not want to increase the dose right now.  Will check a urine microalbumin as well.  - Microalb/Creat Ratio, Random Urine; Future    2. ANIVAL (acute kidney injury)  Note: Advised to keep well-hydrated and will repeat labs  - Basic Metabolic Panel (8) [E]; Future  - Microalb/Creat Ratio, Random Urine; Future    3. Benign essential HTN  Note: Blood pressure has been stable on lisinopril and amlodipine.  If blood pressure still uncontrolled then we will switch the metoprolol to carvedilol    4. Hyperlipidemia associated with type 2 diabetes mellitus ([facility])  Note: Continue statin    5. Moderate nonproliferative diabetic retinopathy of both eyes without macular edema associated with type 2 diabetes mellitus ([facility])  Note: Continue good control of diabetes and continue follow-up with ophthalmologist    6. Diabetic autonomic neuropathy associated with type 2 diabetes mellitus ([facility])  Note: Patient not  interested in gabapentin.  Advised to try vitamin B12 supplement to see if that helps     return to clinic in 3 months or sooner if needed

## 2025-05-19 ENCOUNTER — TELEPHONE (OUTPATIENT)
Dept: INTERNAL MEDICINE CLINIC | Facility: CLINIC | Age: 65
End: 2025-05-19

## 2025-05-19 DIAGNOSIS — I10 BENIGN ESSENTIAL HTN: Primary | ICD-10-CM

## 2025-05-19 RX ORDER — TIRZEPATIDE 5 MG/.5ML
5 INJECTION, SOLUTION SUBCUTANEOUS WEEKLY
Qty: 12 ML | Refills: 0 | Status: SHIPPED | OUTPATIENT
Start: 2025-05-19

## 2025-05-19 NOTE — TELEPHONE ENCOUNTER
Name from pharmacy: Mounjaro 5 MG/0.5ML Subcutaneous Solution Pen-injector         Will file in chart as: MOUNJARO 5 MG/0.5ML Subcutaneous Solution Auto-injector    Sig: INJECT 5MG UNDER THE SKIN ONCE A WEEK    Disp: 12 mL    Refills: 0    Start: 5/17/2025    Class: Normal    Non-formulary    Last ordered: 2 months ago (2/19/2025) by Inna Montalvo DO    Last refill: 4/21/2025    Rx #: [id number]    Diabetes Medication Protocol [id number]/17/2025 01:00 PM   Protocol Details EGFRCR or GFRAA > 50    Last A1C < 7.5 and within past 6 months    In person appointment or virtual visit in the past 6 mos or appointment in next 3 mos    Microalbumin procedure in past 12 months or taking ACE/ARB    GFR in the past 12 months    Medication is active on med list      To be filled at: [facility] [address] [place] [address] [phone], [phone]

## 2025-05-19 NOTE — TELEPHONE ENCOUNTER
Pt says she had  VVR on 5/12 w/ SV and discussed replacing on of her meds. Pt unsure of which med was being replaced and with what.  However, pt would like to confirm this then have the med sent in to her pharm. Please advise.

## 2025-05-19 NOTE — TELEPHONE ENCOUNTER
Spoke to patient who is following up on discussion regarding BP medication change. Patient confirms systolic BP has been 122-142/70's     Patient wants to know if medication will be switched as discussed or if she can refill her current meds.     Last Telehealth visit - 5/12  3. Benign essential HTN  Note: Blood pressure has been stable on lisinopril and amlodipine.  If blood pressure still uncontrolled then we will switch the metoprolol to carvedilol

## 2025-05-20 RX ORDER — CARVEDILOL 3.12 MG/1
3.12 TABLET ORAL 2 TIMES DAILY WITH MEALS
Qty: 180 TABLET | Refills: 0 | Status: SHIPPED | OUTPATIENT
Start: 2025-05-20

## 2025-05-20 NOTE — TELEPHONE ENCOUNTER
I will switch the metoprolol to carvedilol and it will be twice a day.  I am starting her on on the smallest dose and I will see her in person in July.  She can continue with her blood pressure checks as she has been doing    Inna Montalvo DO

## 2025-06-21 ENCOUNTER — LAB ENCOUNTER (OUTPATIENT)
Dept: LAB | Age: 65
End: 2025-06-21
Attending: INTERNAL MEDICINE
Payer: COMMERCIAL

## 2025-06-21 DIAGNOSIS — R80.9 TYPE 2 DIABETES MELLITUS WITH DIABETIC MICROALBUMINURIA, WITHOUT LONG-TERM CURRENT USE OF INSULIN (HCC): ICD-10-CM

## 2025-06-21 DIAGNOSIS — N17.9 AKI (ACUTE KIDNEY INJURY): ICD-10-CM

## 2025-06-21 DIAGNOSIS — E11.29 TYPE 2 DIABETES MELLITUS WITH DIABETIC MICROALBUMINURIA, WITHOUT LONG-TERM CURRENT USE OF INSULIN (HCC): ICD-10-CM

## 2025-06-21 LAB
ANION GAP SERPL CALC-SCNC: 13 MMOL/L (ref 0–18)
BUN BLD-MCNC: 14 MG/DL (ref 9–23)
CALCIUM BLD-MCNC: 9.2 MG/DL (ref 8.7–10.6)
CHLORIDE SERPL-SCNC: 107 MMOL/L (ref 98–112)
CO2 SERPL-SCNC: 22 MMOL/L (ref 21–32)
CREAT BLD-MCNC: 1.15 MG/DL (ref 0.55–1.02)
CREAT UR-SCNC: 86.5 MG/DL
EGFRCR SERPLBLD CKD-EPI 2021: 53 ML/MIN/1.73M2 (ref 60–?)
FASTING STATUS PATIENT QL REPORTED: YES
GLUCOSE BLD-MCNC: 134 MG/DL (ref 70–99)
MICROALBUMIN UR-MCNC: 7 MG/DL
MICROALBUMIN/CREAT 24H UR-RTO: 80.9 UG/MG (ref ?–30)
OSMOLALITY SERPL CALC.SUM OF ELEC: 296 MOSM/KG (ref 275–295)
POTASSIUM SERPL-SCNC: 4 MMOL/L (ref 3.5–5.1)
SODIUM SERPL-SCNC: 142 MMOL/L (ref 136–145)

## 2025-06-21 PROCEDURE — 82570 ASSAY OF URINE CREATININE: CPT

## 2025-06-21 PROCEDURE — 82043 UR ALBUMIN QUANTITATIVE: CPT

## 2025-06-21 PROCEDURE — 36415 COLL VENOUS BLD VENIPUNCTURE: CPT

## 2025-06-21 PROCEDURE — 80048 BASIC METABOLIC PNL TOTAL CA: CPT

## 2025-06-23 NOTE — PROGRESS NOTES
Dear RN, please let the patient know   Protein levels and kidney function is improving.  Urine protein has actually improved a lot from a year ago.  We will discuss more during her appointment.  Inna Montalvo DO

## 2025-07-12 ENCOUNTER — OFFICE VISIT (OUTPATIENT)
Dept: INTERNAL MEDICINE CLINIC | Facility: CLINIC | Age: 65
End: 2025-07-12
Payer: COMMERCIAL

## 2025-07-12 VITALS
WEIGHT: 181.81 LBS | TEMPERATURE: 98 F | DIASTOLIC BLOOD PRESSURE: 70 MMHG | HEART RATE: 68 BPM | OXYGEN SATURATION: 98 % | SYSTOLIC BLOOD PRESSURE: 122 MMHG | HEIGHT: 62 IN | BODY MASS INDEX: 33.46 KG/M2

## 2025-07-12 DIAGNOSIS — E11.3393 MODERATE NONPROLIFERATIVE DIABETIC RETINOPATHY OF BOTH EYES WITHOUT MACULAR EDEMA ASSOCIATED WITH TYPE 2 DIABETES MELLITUS (HCC): ICD-10-CM

## 2025-07-12 DIAGNOSIS — I10 BENIGN ESSENTIAL HTN: ICD-10-CM

## 2025-07-12 DIAGNOSIS — E78.5 HYPERLIPIDEMIA ASSOCIATED WITH TYPE 2 DIABETES MELLITUS (HCC): ICD-10-CM

## 2025-07-12 DIAGNOSIS — I25.10 CORONARY ARTERY DISEASE INVOLVING NATIVE CORONARY ARTERY OF NATIVE HEART WITHOUT ANGINA PECTORIS: ICD-10-CM

## 2025-07-12 DIAGNOSIS — R80.9 TYPE 2 DIABETES MELLITUS WITH DIABETIC MICROALBUMINURIA, WITHOUT LONG-TERM CURRENT USE OF INSULIN (HCC): Primary | ICD-10-CM

## 2025-07-12 DIAGNOSIS — E66.9 OBESITY (BMI 30-39.9): ICD-10-CM

## 2025-07-12 DIAGNOSIS — E11.29 TYPE 2 DIABETES MELLITUS WITH DIABETIC MICROALBUMINURIA, WITHOUT LONG-TERM CURRENT USE OF INSULIN (HCC): Primary | ICD-10-CM

## 2025-07-12 DIAGNOSIS — E11.69 HYPERLIPIDEMIA ASSOCIATED WITH TYPE 2 DIABETES MELLITUS (HCC): ICD-10-CM

## 2025-07-12 PROBLEM — E11.43 DIABETIC AUTONOMIC NEUROPATHY ASSOCIATED WITH TYPE 2 DIABETES MELLITUS (HCC): Status: RESOLVED | Noted: 2025-05-12 | Resolved: 2025-07-12

## 2025-07-12 PROCEDURE — G2211 COMPLEX E/M VISIT ADD ON: HCPCS | Performed by: INTERNAL MEDICINE

## 2025-07-12 PROCEDURE — 3074F SYST BP LT 130 MM HG: CPT | Performed by: INTERNAL MEDICINE

## 2025-07-12 PROCEDURE — 99214 OFFICE O/P EST MOD 30 MIN: CPT | Performed by: INTERNAL MEDICINE

## 2025-07-12 PROCEDURE — 3060F POS MICROALBUMINURIA REV: CPT | Performed by: INTERNAL MEDICINE

## 2025-07-12 PROCEDURE — 3044F HG A1C LEVEL LT 7.0%: CPT | Performed by: INTERNAL MEDICINE

## 2025-07-12 PROCEDURE — 3061F NEG MICROALBUMINURIA REV: CPT | Performed by: INTERNAL MEDICINE

## 2025-07-12 PROCEDURE — 3078F DIAST BP <80 MM HG: CPT | Performed by: INTERNAL MEDICINE

## 2025-07-12 PROCEDURE — 3008F BODY MASS INDEX DOCD: CPT | Performed by: INTERNAL MEDICINE

## 2025-07-12 RX ORDER — LATANOPROST 50 UG/ML
1 SOLUTION/ DROPS OPHTHALMIC
COMMUNITY
Start: 2025-07-08

## 2025-07-12 NOTE — PATIENT INSTRUCTIONS
Lets continue your medicines    Lets repeat your blood work in 6 months    See you back in 6 months

## 2025-07-12 NOTE — PROGRESS NOTES
Patient Office Visit    ASSESSMENT AND PLAN:   1. Type 2 diabetes mellitus with diabetic microalbuminuria, without long-term current use of insulin (HCC)  Note: Will continue with Mounjaro at 5 mg and discussed that the microalbuminuria is improving.  Continue lisinopril.    2. Moderate nonproliferative diabetic retinopathy of both eyes without macular edema associated with type 2 diabetes mellitus (HCC)  Note: Will request records from Dr. Olivas's office as patient does go every week for her eye exams    3. Obesity (BMI 30-39.9)  Note: Continue to work on diet and lifestyle modifications and continue Mounjaro at the current dose    4. Hyperlipidemia associated with type 2 diabetes mellitus (HCC)  Note: Continue statin    5.  CAD  Note: Continue statin, lisinopril and carvedilol.  Follow-up with the cardiologist    6.  Hypertension  Note: Continue with amlodipine, lisinopril and carvedilol    Return to clinic in 6 months    Patient/Caregiver Education: Patient/Caregiver Education: There are no barriers to learning. Medical education done. Outcome: Patient verbalizes understanding. Patient is notified to call with any questions, complications, allergies, or worsening or changing symptoms.  Patient is to call with any side effects or complications from the treatments as a result of today.      Reviewed Past Medical History and   Problem List[1]    No orders of the defined types were placed in this encounter.    Requested Prescriptions      No prescriptions requested or ordered in this encounter         Inna Montalvo DO  CC:  Chief Complaint   Patient presents with    Follow - Up     Follow up          HPI:   Priyanka Bowman is a 64-year-old female who presents for a follow-up    Diabetes: She was concerned that she was on a higher dose of the Mounjaro but we discussed that she has been on 5 mg for a long time and the 2.5 mg is only done for 1 month.  She is tolerating the medicine.  She does go for an eye exam  every week and has been getting more treatment as the retinopathy has not really improved but at least it has not worsened since being on Mounjaro  Hypertension: She has been compliant with the carvedilol most of the days but sometimes she forgets to take the morning dose.  She has been taking the amlodipine and the lisinopril  CAD/obesity: Stable     Past Medical History[2]    Past Surgical History[3]    Social History:  Short Social Hx on File[4]  Family History:  Family History[5]  Allergies:  Allergies[6]  Current Meds:  Medications Ordered Prior to Encounter[7]      REVIEW OF SYSTEMS   Constitutional: no fatigue normal energy no weight changes   HENT: normal sinuses and no mouth issues   Eyes: . normal vision no eye pain   Respiratory: normal respirations no cough   Cardiovascular: no CP, or palpitations   Gastrointestinal: normal bowels and no abd pains   Genitourinary:  normal urination no hematuria, no frequency   Musculoskeletal: no pains in arms/legs, normal range of motion   Skin: no rashes or skin lesions that are new   Neurological:  no weakness, no numbness, normal gait   Hematological:  no bruises or bleeding   Psychiatric/Behavioral: normal mood no anxiety normal behavior     /70   Pulse 68   Temp 98 °F (36.7 °C) (Temporal)   Ht 5' 2\" (1.575 m)   Wt 181 lb 12.8 oz (82.5 kg)   SpO2 98%   BMI 33.25 kg/m²     PHYSICAL EXAM:   Constitutional: Vital signs reviewed as noted, well developed, in no acute distress.   HENT: NCAT  Eyes: pupils reactive bilaterally  Cardiovascular: nl s1 s2 no m/r/g  Pulmonary/Chest: CTA bilaterally with no wheezes  Extremities: no pedal edema   Neurological:  no weakness in UE and LE, reflexes are normal. Bilateral barefoot skin diabetic exam is normal, visualized feet and the appearance is normal.  Bilateral monofilament/sensation of both feet is normal.  Pulsation pedal pulse exam of both lower legs/feet is normal as well.  Skin: no rashes or bruises on visualized  skin, not undressed   Psychiatric:normal mood              [1]   Patient Active Problem List  Diagnosis    Obesity (BMI 30-39.9)    Hyperlipidemia associated with type 2 diabetes mellitus (HCC)    Coronary artery disease involving native coronary artery of native heart without angina pectoris    Mammogram declined    Cervical cancer screening declined    Colon cancer screening declined    Osteoporosis monitoring declined    Moderate nonproliferative diabetic retinopathy of both eyes without macular edema associated with type 2 diabetes mellitus (HCC)    Benign essential HTN    Type 2 diabetes mellitus with diabetic microalbuminuria, without long-term current use of insulin (HCC)    Diabetic autonomic neuropathy associated with type 2 diabetes mellitus (HCC)   [2]   Past Medical History:   Diabetes (HCC)    Essential hypertension    Hyperlipidemia   [3]   Past Surgical History:  Procedure Laterality Date    Angioplasty (coronary)  2018    stent x3          x1   [4]   Social History  Socioeconomic History    Marital status:    Tobacco Use    Smoking status: Never    Smokeless tobacco: Never   Vaping Use    Vaping status: Never Used   Substance and Sexual Activity    Alcohol use: No    Drug use: No   Other Topics Concern    Caffeine Concern No    Exercise No   [5]   Family History  Problem Relation Age of Onset    Diabetes Father    [6]   Allergies  Allergen Reactions    Atorvastatin MYALGIA    Jardiance [Empagliflozin] ITCHING   [7]   Current Outpatient Medications on File Prior to Visit   Medication Sig Dispense Refill    latanoprost 0.005 % Ophthalmic Solution 1 drop.      carvedilol 3.125 MG Oral Tab Take 1 tablet (3.125 mg total) by mouth 2 (two) times daily with meals. 180 tablet 0    MOUNJARO 5 MG/0.5ML Subcutaneous Solution Auto-injector INJECT 5MG UNDER THE SKIN ONCE A WEEK 12 mL 0    AMLODIPINE 10 MG Oral Tab Take 1 tablet by mouth once daily 90 tablet 0    LISINOPRIL 40 MG Oral Tab Take 1  tablet by mouth once daily 90 tablet 0    CONTOUR NEXT TEST In Vitro Strip USE 1 STRIP TO CHECK GLUCOSE THREE TIMES DAILY 100 each 0    rosuvastatin 10 MG Oral Tab Take 1 tablet (10 mg total) by mouth daily.      ONETOUCH DELICA LANCETS 33G Does not apply Misc Use one lancet for each blood glucose test. Test 3 x week in the AM before breakfast, and 2 x a week 2 hours after large meal. 100 each 3    aspirin 81 MG Oral Tab EC Take 1 tablet (81 mg total) by mouth daily. 30 tablet 11    APPLE CIDER VINEGAR OR Take 1 tablet by mouth daily. (Patient not taking: Reported on 7/12/2025)       No current facility-administered medications on file prior to visit.

## 2025-07-24 RX ORDER — LISINOPRIL 40 MG/1
40 TABLET ORAL DAILY
Qty: 90 TABLET | Refills: 0 | Status: SHIPPED | OUTPATIENT
Start: 2025-07-24

## 2025-08-12 RX ORDER — TIRZEPATIDE 5 MG/.5ML
5 INJECTION, SOLUTION SUBCUTANEOUS WEEKLY
Qty: 12 ML | Refills: 0 | Status: SHIPPED | OUTPATIENT
Start: 2025-08-12

## (undated) NOTE — LETTER
[facility] [address] [place], [address] [facility]  Consent for Procedure/Sedation    Date: 11/25/2018    Time: 1500      1.  I authorize the performance upon Larry Loyola the following:cardiac catheterization, left ventricular cineangiography period, the physician will determine when the applicable recovery period ends for purposes of reinstating the Do Not Resuscitate (DNR) order.     Signature of Patient: ____________________________________________________    Signature of person authorized

## (undated) NOTE — LETTER
[facility] [address], [address]  Consent for Procedure/Sedation    Date: 11/25/2018    Time: 1700      1.  I authorize the performance upon Ivon Whitman the following:cardiac catheterization, left ventricular cineangiography period, the physician will determine when the applicable recovery period ends for purposes of reinstating the Do Not Resuscitate (DNR) order.     Signature of Patient: ____________________________________________________    Signature of person authorized

## (undated) NOTE — LETTER
Date: 12/7/2018    Patient Name: Larry Loyola          To Whom it may concern: This letter has been written at the patient's request. The above patient was seen at the [facility] for treatment of a medical condition.     This patient s

## (undated) NOTE — LETTER
06/17/21        Larry Loyola  [address] [place]      Dear Saira Villalobos records indicate that you have outstanding lab work and or testing that was ordered for you and has not yet been completed:  Orders Placed This Encounter

## (undated) NOTE — LETTER
07/16/21        Shanna Bernal  [address] [place]      Dear Mati Shelton records indicate that you have outstanding lab work and or testing that was ordered for you and has not yet been completed:  Orders Placed This Encounter

## (undated) NOTE — LETTER
04/15/21        Justen Lopez  [address] [place]      Dear Rina Arias records indicate that you have outstanding lab work and or testing that was ordered for you and has not yet been completed:  Orders Placed This Encounter